# Patient Record
Sex: FEMALE | Race: WHITE | URBAN - METROPOLITAN AREA
[De-identification: names, ages, dates, MRNs, and addresses within clinical notes are randomized per-mention and may not be internally consistent; named-entity substitution may affect disease eponyms.]

---

## 2018-03-05 ENCOUNTER — THERAPY VISIT (OUTPATIENT)
Dept: PHYSICAL THERAPY | Facility: CLINIC | Age: 68
End: 2018-03-05
Payer: COMMERCIAL

## 2018-03-05 DIAGNOSIS — M25.551 HIP PAIN, RIGHT: Primary | ICD-10-CM

## 2018-03-05 PROCEDURE — G8978 MOBILITY CURRENT STATUS: HCPCS | Mod: GP | Performed by: PHYSICAL THERAPIST

## 2018-03-05 PROCEDURE — G8979 MOBILITY GOAL STATUS: HCPCS | Mod: GP | Performed by: PHYSICAL THERAPIST

## 2018-03-05 PROCEDURE — 97110 THERAPEUTIC EXERCISES: CPT | Mod: GP | Performed by: PHYSICAL THERAPIST

## 2018-03-05 PROCEDURE — 97112 NEUROMUSCULAR REEDUCATION: CPT | Mod: GP | Performed by: PHYSICAL THERAPIST

## 2018-03-05 PROCEDURE — 97161 PT EVAL LOW COMPLEX 20 MIN: CPT | Mod: GP | Performed by: PHYSICAL THERAPIST

## 2018-03-05 ASSESSMENT — ACTIVITIES OF DAILY LIVING (ADL)
GOING_DOWN_1_FLIGHT_OF_STAIRS: NO DIFFICULTY AT ALL
WALKING_UP_STEEP_HILLS: MODERATE DIFFICULTY
HOS_ADL_HIGHEST_POTENTIAL_SCORE: 68
SITTING_FOR_15_MINUTES: NO DIFFICULTY AT ALL
GETTING_INTO_AND_OUT_OF_A_BATHTUB: NO DIFFICULTY AT ALL
GOING_UP_1_FLIGHT_OF_STAIRS: SLIGHT DIFFICULTY
STANDING_FOR_15_MINUTES: SLIGHT DIFFICULTY
WALKING_15_MINUTES_OR_GREATER: SLIGHT DIFFICULTY
DEEP_SQUATTING: UNABLE TO DO
HOS_ADL_COUNT: 17
WALKING_DOWN_STEEP_HILLS: NO DIFFICULTY AT ALL
WALKING_APPROXIMATELY_10_MINUTES: SLIGHT DIFFICULTY
ROLLING_OVER_IN_BED: NO DIFFICULTY AT ALL
LIGHT_TO_MODERATE_WORK: NO DIFFICULTY AT ALL
PUTTING_ON_SOCKS_AND_SHOES: NO DIFFICULTY AT ALL
HOS_ADL_ITEM_SCORE_TOTAL: 56
HOS_ADL_SCORE(%): 82.35
WALKING_INITIALLY: NO DIFFICULTY AT ALL
HEAVY_WORK: MODERATE DIFFICULTY
STEPPING_UP_AND_DOWN_CURBS: NO DIFFICULTY AT ALL
GETTING_INTO_AND_OUT_OF_AN_AVERAGE_CAR: NO DIFFICULTY AT ALL
RECREATIONAL_ACTIVITIES: NO DIFFICULTY AT ALL
TWISTING/PIVOTING_ON_INVOLVED_LEG: NO DIFFICULTY AT ALL

## 2018-03-05 NOTE — MR AVS SNAPSHOT
"              After Visit Summary   3/5/2018    Ava Goddard    MRN: [id number]           Patient Information     Date Of Birth          1950        Visit Information        Provider Department      3/5/2018 10:20 AM Anna Rees PT [facility]        Today's Diagnoses     Hip pain, right    -  1       Follow-ups after your visit        Your next 10 appointments already scheduled     Mar 19, 2018 10:20 AM CDT   MULU Extremity with Anna Rees PT   [facility] ([facility] [place])    [address]  [facility] [address] [phone]              Who to contact     If you have questions or need follow up information about today's clinic visit or your schedule please contact [facility] directly at [phone].  Normal or non-critical lab and imaging results will be communicated to you by Moment.Ushart, letter or phone within 4 business days after the clinic has received the results. If you do not hear from us within 7 days, please contact the clinic through Moment.Ushart or phone. If you have a critical or abnormal lab result, we will notify you by phone as soon as possible.  Submit refill requests through Munch On Me or call your pharmacy and they will forward the refill request to us. Please allow 3 business days for your refill to be completed.          Additional Information About Your Visit        MyChart Information     Munch On Me lets you send messages to your doctor, view your test results, renew your prescriptions, schedule appointments and more. To sign up, go to [email or web address] . Click on \"Log in\" on the left side of the screen, which will take you to the Welcome page. Then click on \"Sign up Now\" on the right side of the page.     You will be asked to enter the access code listed below, as well as some personal " information. Please follow the directions to create your username and password.     Your access code is: [email or web address]  Expires: 6/3/2018 11:47 PM     Your access code will  in 90 days. If you need help or a new code, please call your [place] clinic or [phone].        Care EveryWhere ID     This is your Care EveryWhere ID. This could be used by other organizations to access your [place] medical records  [id number]         Blood Pressure from Last 3 Encounters:   05/13/15 127/79   03/04/15 134/84   02/05/15 127/70    Weight from Last 3 Encounters:   05/13/15 95.7 kg (211 lb)   03/04/15 93.4 kg (206 lb)   02/05/15 93 kg (205 lb)              We Performed the Following     MULU Inital Eval Report     Neuromuscular Re-Education     PT Eval, Low Complexity (09244)     Therapeutic Exercises        Primary Care Provider Office Phone # Fax #    Donita Rhina Lucio, APRN [facility] [phone]       [address] [place] [address]        Equal Access to Services     [facility]: Hadii aad ku hadasho Soomaali, waaxda luqadaha, qaybta kaalmada adeegyada, kristofer villafana . So [facility] [phone].    ATENCIÓN: Si habla español, tiene a tamayo disposición servicios gratuitos de asistencia lingüística. [facility] [phone].    We comply with applicable federal civil rights laws and [place] laws. We do not discriminate on the basis of race, color, national origin, age, disability, sex, sexual orientation, or gender identity.            Thank you!     Thank you for choosing [facility]  for your care. Our goal is always to provide you with excellent care. Hearing back from our patients is one way we can continue to improve our services. Please take a few minutes to complete the written survey that you may receive in the mail after your visit with us. Thank you!             Your Updated Medication List - Protect others around  "you: Learn how to safely use, store and throw away your medicines at www.disposemymeds.org.          This list is accurate as of 3/5/18 11:47 PM.  Always use your most recent med list.                   Brand Name Dispense Instructions for use Diagnosis    ACCU-CHEK COMPLETE Kit     1 each    1 each 2 times daily    Type 2 diabetes, HbA1c goal < 7% (H)       ASPIRIN PO      Take 81 mg by mouth daily        blood glucose monitoring test strip    no brand specified    100 strip    Use to test blood sugar 2 times daily or as directed.    Type 2 diabetes, HbA1c goal < 7% (H)       Calcium Carb-Cholecalciferol 600-800 MG-UNIT Tabs      Take 1 tablet by mouth daily        clotrimazole 1 % cream    LOTRIMIN    40 g    Apply topically 2 times daily    Onychomycosis       cyanocobalamin 1000 MCG/ML injection    VITAMIN B12    1 mL    Inject 1 mL (1,000 mcg) into the muscle every 30 days    Vitamin B12 deficiency       ferrous sulfate 325 (65 FE) MG tablet    IRON     Take 325 mg by mouth daily (with breakfast)        * glipiZIDE 2.5 MG 24 hr tablet    GLUCOTROL XL     2.5 mg        * glipiZIDE 2.5 MG 24 hr tablet    GLUCOTROL XL    90 tablet    TAKE 1 TABLET (2.5 MG) BY MOUTH DAILY    Diabetes mellitus, type 2 (H)       IBUPROFEN PO      Take 400 mg by mouth daily        insulin glargine 100 UNIT/ML injection    LANTUS SOLOSTAR    3 Month    Inject 21 units nightly.    Type 2 diabetes, HbA1c goal < 7% (H)       insulin syringe-needle U-100 30G X 1/2\" 1 ML    BD insulin syringe ultrafine    12 each    Use one syringe monthly to inject B12.    Vitamin B12 deficiency       levothyroxine 125 MCG tablet    SYNTHROID/LEVOTHROID     Take 125 mcg by mouth daily        lisinopril 40 MG tablet    PRINIVIL/ZESTRIL     Take 1 tablet (40 mg) by mouth daily        * metFORMIN 1000 MG tablet    GLUCOPHAGE     Take 1,000 mg by mouth 2 times daily (with meals)        * metFORMIN 500 MG tablet    GLUCOPHAGE    180 tablet    TAKE 2 TABLETS " (1,000 MG) BY MOUTH 2 TIMES DAILY (WITH MEALS)    Type 2 diabetes, HbA1c goal < 7% (H)       order for DME     2 each    Equipment being ordered: bilateral wrist splints    Chronic thumb pain, unspecified laterality       simvastatin 5 MG tablet    ZOCOR    90 tablet    TAKE 2 TABLETS BY MOUTH AT BEDTIME    Other and unspecified hyperlipidemia       * Notice:  This list has 4 medication(s) that are the same as other medications prescribed for you. Read the directions carefully, and ask your doctor or other care provider to review them with you.

## 2018-03-05 NOTE — PROGRESS NOTES
[place] [facility] Initial Evaluation  Subjective:  Patient is a 67 year old female presenting with rehab right hip hpi.   Ava Goddard is a 67 year old female with a right hip condition.  Condition occurred with:  Other reason (pulled myself up onto a shuttle bus).  Condition occurred: at work.  This is a new condition  2 weeks on or about 2/19/2018, I was stepping up into a shuttle bus with my right leg was up on a step.  I started to feel a pull in the right low back and gluteal.  I can't sit for a long time.  It is gradually getting better..    Patient reports pain:  Posterior (gluteal ).  Radiates to:  Low back.  Pain is described as aching and is intermittent and reported as 2/10 and 5/10.  Associated with: none. Pain is the same all the time.  Symptoms are exacerbated by lying on extremity and relieved by NSAID's.  Since onset symptoms are gradually improving.  Special tests:  X-ray.  Previous treatment: none.    General health as reported by patient is good.  Pertinent medical history includes:  Rheumatoid arthritis, high blood pressure, osteoporosis, history of fractures, diabetes, overweight, thyroid problems and anemia.  Medical allergies: no.  Other surgeries include:  Orthopedic surgery (left TKA, bilateral ankle surgery with plates).  Current medications:  Thyroid medication and anti-inflammatory.  Current occupation is .  Patient is working in normal job without restrictions.  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    Barriers: house with daughter.    Red flags:  None as reported by the patient.                        Objective:  Standing Alignment:    Cervical/Thoracic:  Forward head  Shoulder/UE:  Rounded shoulders  Lumbar:  Lordosis incr  Pelvic:  Normal          Gait:      Deviations:  Hip:  Decr dynamic control L and decr dynamic control R    Flexibility/Screens:   Positive screens:  HipNegative screens:  Lumbar     Lower Extremity:  Decreased left lower extremity flexibility:Hip Flexors; Quadriceps; Hamstrings and Gastroc    Decreased right lower extremity flexibility:  Hip Flexors; Quadriceps; Hamstrings and Gastroc               Lumbar/SI Evaluation  ROM:    AROM Lumbar:   Flexion:        Floor, repetition does not increase in pain   Ext:                    Moderate with end range pain    Side Bend:        Left:     Right:   Rotation:           Left:     Right:   Side Glide:        Left:     Right:           Lumbar Myotomes:    T12-L3 (Hip Flex):  Left: 5    Right: 5  L2-4 (Quads):  Left:  5    Right:  5  L4 (Ankle DF):  Left:  5    Right:  5            Neural Tension/Mobility:      Left side:Slump (tightness)  negative.     Right side:   Slump (tightness)  negative.   Lumbar Palpation:      Tenderness present at Right: Quadratus Lumborum; Erector Spinae; Piriformis; Gluteus Medius; Greater Trochanter and Vertebral  Functional Tests:  Core strength and proprioception lumbar: balance bilateral 1 sec         Lumbar Provocation:      Left negative with:  Mobility  Right positive with: PROM hip  Right negative with:  Mobility  Spinal Segmental Conclusions:     Level: Hypo noted at L4 and L5                                        Hip Evaluation  Hip PROM:    Flexion: Left:  Right: WFL with pinch  Extension: Left:  Right: minimal with tightness  Abduction: Left:   Right: moderate movement    Internal Rotation: Left:   Right: WFL  External Rotation: Left:   Right: 45  Knee Flexion: Left:     Right:  WFL  Knee Extension: Left:   Right:  WFL          Hip Strength:    Flexion:   Left: 5/5   Pain:  Right: 5/5   Pain:                    Extension:  Left: 4+/5  Pain:Right: 4-/5    Pain:    Abduction:  Right: 4-/5   +   Pain:        Knee Flexion:  Left: 5/5   Pain:Right: 5/5   Pain:  Knee Extension:  Left: 5/5   Pain:Right: 5/5    Pain:        Hip Special Testing:      Left hip negative for the following special tests:  SLR  (tightness)  Right hip negative for the following special tests:  SLR (tightness)                 General     ROS    Assessment/Plan:    Patient is a 67 year old female with right side hip/back pain  complaints.    Patient has the following significant findings with corresponding treatment plan.                Diagnosis 1:  Right flank and SI pain   Pain -  manual therapy, self management, education, directional preference exercise and home program  Decreased ROM/flexibility - manual therapy, therapeutic exercise, therapeutic activity and home program  Decreased joint mobility - manual therapy, therapeutic exercise, therapeutic activity and home program  Decreased strength - therapeutic exercise, therapeutic activities and home program  Impaired balance - neuro re-education, therapeutic activities and home program  Impaired muscle performance - neuro re-education and home program  Impaired posture - neuro re-education, therapeutic activities and home program  Evaluation ongoing    Therapy Evaluation Codes:   1) History comprised of:   Personal factors that impact the plan of care:      Profession and Work status.    Comorbidity factors that impact the plan of care are:      Diabetes, High blood pressure, Implanted device, Pain at night/rest, Rheumatoid arthritis and bilateral ankle surgeries, osteoporosis, anemia.     Medications impacting care: thyroid.  2) Examination of Body Systems comprised of:   Body structures and functions that impact the plan of care:      Hip, Lumbar spine, Pelvis and Sacral illiac joint.   Activity limitations that impact the plan of care are:      Bending, Driving, Lifting, Walking, Working, Sleeping and Laying down.  3) Clinical presentation characteristics are:   Stable/Uncomplicated.  4) Decision-Making    Low complexity using standardized patient assessment instrument and/or measureable assessment of functional outcome.  Cumulative Therapy Evaluation is: Low complexity.    Previous  and current functional limitations:  (See Goal Flow Sheet for this information)    Short term and Long term goals: (See Goal Flow Sheet for this information)     Communication ability:  Patient appears to be able to clearly communicate and understand verbal and written communication and follow directions correctly.  Treatment Explanation - The following has been discussed with the patient:   RX ordered/plan of care  Possible risks and side effects  This patient would benefit from PT intervention to resume normal activities.   Rehab potential is good.    Frequency:  1 X week, once daily  Duration:  for 6 weeks  Discharge Plan:  Achieve all LTG.  Independent in home treatment program.    Please refer to the daily flowsheet for treatment today, total treatment time and time spent performing 1:1 timed codes.

## 2018-03-05 NOTE — LETTER
[facility]  [address]  [facility] [address]  [phone]    2018    Re: Ava Goddard   :   1950  MRN:  [id number]   REFERRING PHYSICIAN:   Becca MARC [facility]    Date of Initial Evaluation:  3/5/2018  Visits:  Rxs Used: 1  Reason for Referral:  Hip pain, right    EVALUATION SUMMARY    [facility] Initial Evaluation  Subjective:  Patient is a 67 year old female presenting with rehab right hip hpi.   Ava Goddard is a 67 year old female with a right hip condition.  Condition occurred with:  Other reason (pulled myself up onto a shuttle bus).  Condition occurred: at work.  This is a new condition  2 weeks on or about 2018, I was stepping up into a shuttle bus with my right leg was up on a step.  I started to feel a pull in the right low back and gluteal.  I can't sit for a long time.  It is gradually getting better.    Patient reports pain:  Posterior (gluteal).  Radiates to:  Low back.  Pain is described as aching and is intermittent and reported as 2/10 and 5/10.  Associated with: none. Pain is the same all the time.  Symptoms are exacerbated by lying on extremity and relieved by NSAID's.  Since onset symptoms are gradually improving.  Special tests:  X-ray.  Previous treatment: none.  General health as reported by patient is good.  Pertinent medical history includes:  Rheumatoid arthritis, high blood pressure, osteoporosis, history of fractures, diabetes, overweight, thyroid problems and anemia.  Medical allergies: no.  Other surgeries include:  Orthopedic surgery (left TKA, bilateral ankle surgery with plates).  Current medications:  Thyroid medication and anti-inflammatory.  Current occupation is .  Patient is working in normal job without restrictions.  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    Barriers: house with daughter.    Red flags:  None as reported by the patient.    Objective:  Standing Alignment:    Cervical/Thoracic:  Forward head  Shoulder/UE:  Rounded shoulders  Lumbar:  Lordosis incr  Pelvic:  Normal        Re: Ava Goddard   :   1950    Gait:    Deviations:  Hip:  Decr dynamic control L and decr dynamic control R    Flexibility/Screens:   Positive screens:  HipNegative screens: Lumbar     Lower Extremity:  Decreased left lower extremity flexibility:Hip Flexors; Quadriceps; Hamstrings and Gastroc    Decreased right lower extremity flexibility:  Hip Flexors; Quadriceps; Hamstrings and Gastroc    Lumbar/SI Evaluation  ROM:    AROM Lumbar:   Flexion:        Floor, repetition does not increase in pain   Ext:                    Moderate with end range pain    Side Bend:        Left:     Right:   Rotation:           Left:     Right:   Side Glide:        Left:     Right:         Lumbar Myotomes:    T12-L3 (Hip Flex):  Left: 5    Right: 5  L2-4 (Quads):  Left:  5    Right:  5  L4 (Ankle DF):  Left:  5    Right:  5    Neural Tension/Mobility:    Left side:Slump (tightness)  negative.   Right side:   Slump (tightness)  negative.     Lumbar Palpation:    Tenderness present at Right: Quadratus Lumborum; Erector Spinae; Piriformis; Gluteus Medius; Greater Trochanter and Vertebral    Functional Tests:  Core strength and proprioception lumbar: balance bilateral 1 sec     Lumbar Provocation:    Left negative with:  Mobility  Right positive with: PROM hip  Right negative with:  Mobility    Spinal Segmental Conclusions:   Level: Hypo noted at L4 and L5    Hip Evaluation  Hip PROM:    Flexion: Left:  Right: WFL with pinch  Extension: Left:  Right: minimal with tightness  Abduction: Left:   Right: moderate movement    Re: Ava Goddard   :   1950    Internal Rotation: Left:   Right: WFL  External Rotation: Left:   Right: 45  Knee Flexion:  Left:     Right:  WFL  Knee Extension: Left:   Right:  WFL    Hip Strength:    Flexion:   Left: 5/5   Pain:  Right: 5/5   Pain:  Extension:  Left: 4+/5  Pain:Right: 4-/5    Pain:    Abduction:  Right: 4-/5   +   Pain:  Knee Flexion:  Left: 5/5   Pain:Right: 5/5   Pain:  Knee Extension:  Left: 5/5   Pain:Right: 5/5    Pain:    Hip Special Testing:    Left hip negative for the following special tests:  SLR (tightness)  Right hip negative for the following special tests:  SLR (tightness)      Assessment/Plan:    Patient is a 67 year old female with right side hip/back pain  complaints.    Patient has the following significant findings with corresponding treatment plan.                Diagnosis 1:  Right flank and SI pain   Pain -  manual therapy, self management, education, directional preference exercise and home program  Decreased ROM/flexibility - manual therapy, therapeutic exercise, therapeutic activity and home program  Decreased joint mobility - manual therapy, therapeutic exercise, therapeutic activity and home program  Decreased strength - therapeutic exercise, therapeutic activities and home program  Impaired balance - neuro re-education, therapeutic activities and home program  Impaired muscle performance - neuro re-education and home program  Impaired posture - neuro re-education, therapeutic activities and home program  Evaluation ongoing    Therapy Evaluation Codes:   1) History comprised of:   Personal factors that impact the plan of care:      Profession and Work status.    Comorbidity factors that impact the plan of care are:      Diabetes, High blood pressure, Implanted device, Pain at night/rest,   Rheumatoid arthritis and bilateral ankle surgeries, osteoporosis, anemia.     Medications impacting care: thyroid.  2) Examination of Body Systems comprised of:   Body structures and functions that impact the plan of care:      Hip, Lumbar spine, Pelvis and Sacral illiac joint.   Activity limitations that  impact the plan of care are:      Bending, Driving, Lifting, Walking, Working, Sleeping and Laying down.  3) Clinical presentation characteristics are:   Stable/Uncomplicated.  4) Decision-Making    Low complexity using standardized patient assessment instrument and/or   measureable assessment of functional outcome.  Cumulative Therapy Evaluation is: Low complexity.  Re: Ava Goddard   :   1950    Previous and current functional limitations:  (See Goal Flow Sheet for this information)    Short term and Long term goals: (See Goal Flow Sheet for this information)     Communication ability:  Patient appears to be able to clearly communicate and understand verbal and written communication and follow directions correctly.  Treatment Explanation - The following has been discussed with the patient:   RX ordered/plan of care  Possible risks and side effects  This patient would benefit from PT intervention to resume normal activities.   Rehab potential is good.    Frequency:  1 X week, once daily  Duration:  for 6 weeks  Discharge Plan:  Achieve all LTG.  Independent in home treatment program.        Thank you for your referral.      INQUIRIES  Therapist: Anna Rees, PT  [facility]  [address] [address]  Phone: [phone]  Fax: [phone]

## 2018-04-02 PROBLEM — M25.551 HIP PAIN, RIGHT: Status: RESOLVED | Noted: 2018-03-05 | Resolved: 2018-04-02

## 2018-04-02 NOTE — PROGRESS NOTES
Reported doing well.  Felt good doing the exercises.  Probably not returning.  Plan to DC at this time.

## 2018-10-03 ENCOUNTER — OFFICE VISIT (OUTPATIENT)
Dept: ORTHOPEDICS | Facility: CLINIC | Age: 68
End: 2018-10-03
Payer: COMMERCIAL

## 2018-10-03 VITALS
WEIGHT: 202 LBS | RESPIRATION RATE: 18 BRPM | SYSTOLIC BLOOD PRESSURE: 146 MMHG | HEIGHT: 67 IN | BODY MASS INDEX: 31.71 KG/M2 | DIASTOLIC BLOOD PRESSURE: 84 MMHG

## 2018-10-03 DIAGNOSIS — S42.201A TRAUMATIC CLOSED FX OF PROXIMAL HUMERUS WITH MINIMAL DISPLACEMENT, RIGHT, INITIAL ENCOUNTER: Primary | ICD-10-CM

## 2018-10-03 PROCEDURE — 99203 OFFICE O/P NEW LOW 30 MIN: CPT | Mod: 57 | Performed by: ORTHOPAEDIC SURGERY

## 2018-10-03 PROCEDURE — 23600 CLTX PROX HUMRL FX W/O MNPJ: CPT | Mod: RT | Performed by: ORTHOPAEDIC SURGERY

## 2018-10-03 ASSESSMENT — PAIN SCALES - GENERAL: PAINLEVEL: EXTREME PAIN (8)

## 2018-10-03 NOTE — MR AVS SNAPSHOT
"              After Visit Summary   10/3/2018    Ava Goddard    MRN: [id number]           Patient Information     Date Of Birth          1950        Visit Information        Provider Department      10/3/2018 11:00 AM Ron Robert MD [facility]        Today's Diagnoses     Traumatic closed fx of proximal humerus with minimal displacement, right, initial encounter    -  1       Follow-ups after your visit        Follow-up notes from your care team     Return in about 2 weeks (around 10/17/2018) for Fracture recheck.      Who to contact     If you have questions or need follow up information about today's clinic visit or your schedule please contact [facility] directly at [phone].  Normal or non-critical lab and imaging results will be communicated to you by MyChart, letter or phone within 4 business days after the clinic has received the results. If you do not hear from us within 7 days, please contact the clinic through MyChart or phone. If you have a critical or abnormal lab result, we will notify you by phone as soon as possible.  Submit refill requests through [facility] or call your pharmacy and they will forward the refill request to us. Please allow 3 business days for your refill to be completed.          Additional Information About Your Visit        Care EveryWhere ID     This is your Care EveryWhere ID. This could be used by other organizations to access your [facility] medical records  [id number]        Your Vitals Were     Respirations Height BMI (Body Mass Index)             18 5' 6.5\" (1.689 m) 32.12 kg/m2          Blood Pressure from Last 3 Encounters:   10/03/18 146/84   05/13/15 127/79   03/04/15 134/84    Weight from Last 3 Encounters:   10/03/18 202 lb (91.6 kg)   05/13/15 211 lb (95.7 kg)   03/04/15 206 lb (93.4 kg)              We Performed the Following     CLOSED TX PROX HUMERUS FX W/O MANIPULATION        Primary Care Provider Office Phone # Fax # "    Donita Lucio, APRN [phone]       [address] [facility]  [place] [address]        Equal Access to Services     CARLOSPASQUALE CHRISTOS : Hadii shantell ku roneno Soomaali, waaxda luqadaha, qaybta kaalmada adereginada, kristofer freemansahil sanket. So [facility] [phone].    ATENCIÓN: Si habla español, tiene a tamayo disposición servicios gratuitos de asistencia lingüística. Llame al [phone].    We comply with applicable federal civil rights laws and [place] laws. We do not discriminate on the basis of race, color, national origin, age, disability, sex, sexual orientation, or gender identity.            Thank you!     Thank you for choosing [facility]  for your care. Our goal is always to provide you with excellent care. Hearing back from our patients is one way we can continue to improve our services. Please take a few minutes to complete the written survey that you may receive in the mail after your visit with us. Thank you!             Your Updated Medication List - Protect others around you: Learn how to safely use, store and throw away your medicines at www.disposemymeds.org.          This list is accurate as of 10/3/18  3:55 PM.  Always use your most recent med list.                   Brand Name Dispense Instructions for use Diagnosis    ACCU-CHEK COMPLETE Kit     1 each    1 each 2 times daily    Type 2 diabetes, HbA1c goal < 7% (H)       ASPIRIN PO      Take 81 mg by mouth daily        blood glucose monitoring test strip    no brand specified    100 strip    Use to test blood sugar 2 times daily or as directed.    Type 2 diabetes, HbA1c goal < 7% (H)       Calcium Carb-Cholecalciferol 600-800 MG-UNIT Tabs      Take 1 tablet by mouth daily        clotrimazole 1 % cream    LOTRIMIN    40 g    Apply topically 2 times daily    Onychomycosis       cyanocobalamin 1000 MCG/ML injection    VITAMIN B12    1 mL    Inject 1 mL (1,000 mcg) into the muscle every 30 days     "Vitamin B12 deficiency       ferrous sulfate 325 (65 Fe) MG tablet    IRON     Take 325 mg by mouth daily (with breakfast)        * glipiZIDE 2.5 MG 24 hr tablet    GLUCOTROL XL     2.5 mg        * glipiZIDE 2.5 MG 24 hr tablet    GLUCOTROL XL    90 tablet    TAKE 1 TABLET (2.5 MG) BY MOUTH DAILY    Diabetes mellitus, type 2 (H)       IBUPROFEN PO      Take 400 mg by mouth daily        insulin glargine 100 UNIT/ML injection    LANTUS SOLOSTAR    3 Month    Inject 21 units nightly.    Type 2 diabetes, HbA1c goal < 7% (H)       insulin syringe-needle U-100 30G X 1/2\" 1 ML    BD insulin syringe ultrafine    12 each    Use one syringe monthly to inject B12.    Vitamin B12 deficiency       levothyroxine 125 MCG tablet    SYNTHROID/LEVOTHROID     Take 125 mcg by mouth daily        lisinopril 40 MG tablet    PRINIVIL/ZESTRIL     Take 1 tablet (40 mg) by mouth daily        * metFORMIN 1000 MG tablet    GLUCOPHAGE     Take 1,000 mg by mouth 2 times daily (with meals)        * metFORMIN 500 MG tablet    GLUCOPHAGE    180 tablet    TAKE 2 TABLETS (1,000 MG) BY MOUTH 2 TIMES DAILY (WITH MEALS)    Type 2 diabetes, HbA1c goal < 7% (H)       order for DME     2 each    Equipment being ordered: bilateral wrist splints    Chronic thumb pain, unspecified laterality       simvastatin 5 MG tablet    ZOCOR    90 tablet    TAKE 2 TABLETS BY MOUTH AT BEDTIME    Other and unspecified hyperlipidemia       * Notice:  This list has 4 medication(s) that are the same as other medications prescribed for you. Read the directions carefully, and ask your doctor or other care provider to review them with you.      "

## 2018-10-03 NOTE — LETTER
"    10/3/2018         RE: Ava Goddard  [address]  [place] [facility] [address]        Dear Colleague,    Thank you for referring your patient, Ava Goddard, to the [facility]. Please see a copy of my visit note below.    CHIEF COMPLAINT:   Chief Complaint   Patient presents with     Right Shoulder - Pain     She fell at 1:50am while on her way to the bathroom. Went to [facility]. Unsure how she fell. Had x-rays, given sling and follow-up here.      Shoulder Pain     She is only taking ASA for pain, does not want to take any \"strong meds\". Right hand dominant. Presents here with her son.    .    HISTORY:  Ava Goddard is a 67 year old female, right -hand dominant, who is seen for right shoulder injury that occurred at 1:50 AM this morning, 10/3/2018. She was walking to the bathroom, felt lightheaded and tripped over her dog, falling onto the right shoulder. Was seen at [facility] and this morning and treated with a sling. Told to follow up with orthopaedics. She has extreme pain today, rated an 8/10. Pain is located over the proximal humerus and lateral shoulder. For treatment, she remains in the shoulder sling and has been taking ibuprofen and aspirin. Denies wanting to take stronger medication. Presents with her son today.     Onset: following acute injury: fall  Symptoms have been worsening since that time.  Aggravated by: with shoulder range of motion and palpation.  Relieved by: at rest, with ibuprofen briefly  Present symptoms: pain with any shoulder range of motion and palpation  Pain location: lateral shoulder, proximal arm  Pain severity: 8/10  Pain quality: sharp  Frequency of symptoms: frequently  Associated symptoms: none    Treatment up to this point: sling, aspirin, ibuprofen (declined pain medications at emergency room)  Prior history of related problems: no prior problems with this area in the past    Significant Orthopedic past medical history: none  Usual level " of recreational activity: sedentary  Usual level of work activity: sedentary -     Other PMH:  has a past medical history of Diabetes (H); Hypertension; and Thyroid disease.  Patient Active Problem List    Diagnosis Date Noted     Rosacea 2015     Priority: Medium     Thyroid nodule 2015     Priority: Medium     Type 2 diabetes mellitus with hyperglycemia (H) 2015     Priority: Medium     Hypothyroidism 2015     Priority: Medium     B12 deficiency 2015     Priority: Medium     S/P gastric bypass 2015     Priority: Medium     Iron deficiency anemia 2015     Priority: Medium     Osteoporosis 2015     Priority: Medium     Problem list name updated by automated process. Provider to review       Hyperlipidemia with target LDL less than 100 2015     Priority: Medium     Diagnosis updated by automated process. Provider to review and confirm.       Hypertension goal BP (blood pressure) < 140/90 2015     Priority: Medium       Surgical Hx:  has a past surgical history that includes  section (); TOTAL KNEE ARTHROPLASTY (Left); gastric bypass; Right Ankle ORIF; cataract iol, rt/lt; and tubal ligation.    Medications:   Current Outpatient Prescriptions:      ASPIRIN PO, Take 81 mg by mouth daily, Disp: , Rfl:      Blood Glucose Monitoring Suppl (ACCU-CHEK COMPLETE) KIT, 1 each 2 times daily, Disp: 1 each, Rfl: 0     blood glucose test strip, Use to test blood sugar 2 times daily or as directed., Disp: 100 strip, Rfl: 5     Calcium Carb-Cholecalciferol 600-800 MG-UNIT TABS, Take 1 tablet by mouth daily, Disp: , Rfl:      clotrimazole (LOTRIMIN) 1 % cream, Apply topically 2 times daily, Disp: 40 g, Rfl: 2     cyanocobalamin (VITAMIN B12) 1000 MCG/ML injection, Inject 1 mL (1,000 mcg) into the muscle every 30 days, Disp: 1 mL, Rfl: 11     ferrous sulfate (IRON) 325 (65 FE) MG tablet, Take 325 mg by mouth daily (with breakfast), Disp: , Rfl:       "glipiZIDE (GLUCOTROL XL) 2.5 MG 24 hr tablet, TAKE 1 TABLET (2.5 MG) BY MOUTH DAILY, Disp: 90 tablet, Rfl: 0     glipiZIDE (GLUCOTROL XL) 2.5 MG 24 hr tablet, 2.5 mg, Disp: , Rfl: 0     IBUPROFEN PO, Take 400 mg by mouth daily, Disp: , Rfl:      insulin glargine (LANTUS SOLOSTAR) 100 UNIT/ML PEN, Inject 21 units nightly., Disp: 3 Month, Rfl: 1     insulin syringe-needle U-100 (BD INSULIN SYRINGE ULTRAFINE) 30G X 1/2\" 1 ML, Use one syringe monthly to inject B12., Disp: 12 each, Rfl: 1     levothyroxine (SYNTHROID, LEVOTHROID) 125 MCG tablet, Take 125 mcg by mouth daily, Disp: , Rfl:      lisinopril (PRINIVIL,ZESTRIL) 40 MG tablet, Take 1 tablet (40 mg) by mouth daily, Disp: , Rfl:      metFORMIN (GLUCOPHAGE) 1000 MG tablet, Take 1,000 mg by mouth 2 times daily (with meals), Disp: , Rfl:      metFORMIN (GLUCOPHAGE) 500 MG tablet, TAKE 2 TABLETS (1,000 MG) BY MOUTH 2 TIMES DAILY (WITH MEALS), Disp: 180 tablet, Rfl: 0     ORDER FOR DME, Equipment being ordered: bilateral wrist splints, Disp: 2 each, Rfl: 0     simvastatin (ZOCOR) 5 MG tablet, TAKE 2 TABLETS BY MOUTH AT BEDTIME, Disp: 90 tablet, Rfl: 0    Allergies: No Known Allergies    Social Hx:  .  reports that she has never smoked. She has never used smokeless tobacco. She reports that she drinks alcohol. She reports that she does not use illicit drugs.    Family Hx: family history includes Diabetes in her paternal grandfather; Hypertension in her sister and son; Thyroid Disease in her maternal grandmother..    REVIEW OF SYSTEMS: 10 point ROS neg other than the symptoms noted above in the HPI and PMH. Notables include  CONSTITUTIONAL:NEGATIVE for fever, chills, change in weight  INTEGUMENTARY/SKIN: NEGATIVE for worrisome rashes, moles or lesions  MUSCULOSKELETAL:See HPI above  NEURO: NEGATIVE for weakness, dizziness or paresthesias    This document serves as a record of the services and decisions personally performed and made by Ron Robert MD. It was created " "on his behalf by Danielle Feliz, a trained medical scribe. The creation of this document is based the provider's statements to the medical scribe.    Scribbroderick Feliz 11:00 AM 10/3/2018    PHYSICAL EXAM:  /84 (BP Location: Left arm)  Resp 18  Ht 1.689 m (5' 6.5\")  Wt 91.6 kg (202 lb)  BMI 32.12 kg/m2   GENERAL APPEARANCE: healthy, alert, no distress  SKIN: no suspicious lesions or rashes  NEURO: Normal strength and tone, mentation intact and speech normal  PSYCH:  mentation appears normal and affect normal, not anxious  RESPIRATORY: No increased work of breathing.  VASCULAR: Radial pulses 2+ and brisk cappillary refill     MUSCULOSKELETAL:    RIGHT UPPER EXTREMITY:  Sensation intact to light touch in median, radial, ulnar and axillary nerve distributions  Palpable 2+ radial pulse, brisk capillary refill to all fingers, wwp  Intact epl fpl fdp edc wrist flexion/extension biceps triceps deltoid    RIGHT SHOULDER:  Shoulder Inspection: mild swelling, mild warmth. No ecchymosis. No erythema.  Tender: diffuse about the shoulder.  Range of Motion: not tested  Strength: not tested    X-RAY INTERPRETATION: 3 views right shoulder obtained 10/3/2018 by [facility] were reviewed personally in clinic today with the patient. On my review, there is a minimally displaced fracture of the proximal humerus neck, mild impaction and comminution.      ASSESSMENT: Ava Goddard is a 67 year old female, right -hand dominant with a minimally displaced right proximal humerus fracture.    PLAN:  * reviewed xrays with patient. Based on the xrays, the fracture is minimally/mildly displaced and in acceptable alignment. As long as fracture maintains alignment, nonoperative treatment can be pursued. I did discuss that we would need to monitor this fracture closely for the next couple of weeks, such that if fracture does displace, we can catch it sooner rather than later, and proceed with treatment at that time. Patient does " understand.      * Explained that swelling into the arm and hand in the coming days is common, as well as bruising.  * Immobilization: continue to be in sling, 24/7.  * non weight bearing, strict.  * Rest  * Activity modification - avoid activities that aggravate symptoms.  * NSAIDS - regular use for inflammation, with food, as long as no contra-indications. Please discuss with pcp if needed.  * Ice twice daily to three times daily.  * Elevation of extremity to reduce swelling  * Tylenol as needed for pain  * return to clinic 2 weeks, AP/Y views right shoulder.       The information in this document, created by a scribe for me, accurately reflects the services I personally performed and the decisions made by me. I have reviewed and approved this document for accuracy.     Ron Robert M.D., M.S.  Dept. of Orthopaedic Surgery  [facility]      Again, thank you for allowing me to participate in the care of your patient.        Sincerely,        Ron Robert MD

## 2018-10-03 NOTE — PROGRESS NOTES
"CHIEF COMPLAINT:   Chief Complaint   Patient presents with     Right Shoulder - Pain     She fell at 1:50am while on her way to the bathroom. Went to [facility]. Unsure how she fell. Had x-rays, given sling and follow-up here.      Shoulder Pain     She is only taking ASA for pain, does not want to take any \"strong meds\". Right hand dominant. Presents here with her son.    .    HISTORY:  Ava Goddard is a 67 year old female, right -hand dominant, who is seen for right shoulder injury that occurred at 1:50 AM this morning, 10/3/2018. She was walking to the bathroom, felt lightheaded and tripped over her dog, falling onto the right shoulder. Was seen at [facility] and this morning and treated with a sling. Told to follow up with orthopaedics. She has extreme pain today, rated an 8/10. Pain is located over the proximal humerus and lateral shoulder. For treatment, she remains in the shoulder sling and has been taking ibuprofen and aspirin. Denies wanting to take stronger medication. Presents with her son today.     Onset: following acute injury: fall  Symptoms have been worsening since that time.  Aggravated by: with shoulder range of motion and palpation.  Relieved by: at rest, with ibuprofen briefly  Present symptoms: pain with any shoulder range of motion and palpation  Pain location: lateral shoulder, proximal arm  Pain severity: 8/10  Pain quality: sharp  Frequency of symptoms: frequently  Associated symptoms: none    Treatment up to this point: sling, aspirin, ibuprofen (declined pain medications at emergency room)  Prior history of related problems: no prior problems with this area in the past    Significant Orthopedic past medical history: none  Usual level of recreational activity: sedentary  Usual level of work activity: sedentary -     Other PMH:  has a past medical history of Diabetes (H); Hypertension; and Thyroid disease.  Patient Active Problem List    Diagnosis Date Noted     Rosacea " 2015     Priority: Medium     Thyroid nodule 2015     Priority: Medium     Type 2 diabetes mellitus with hyperglycemia (H) 2015     Priority: Medium     Hypothyroidism 2015     Priority: Medium     B12 deficiency 2015     Priority: Medium     S/P gastric bypass 2015     Priority: Medium     Iron deficiency anemia 2015     Priority: Medium     Osteoporosis 2015     Priority: Medium     Problem list name updated by automated process. Provider to review       Hyperlipidemia with target LDL less than 100 2015     Priority: Medium     Diagnosis updated by automated process. Provider to review and confirm.       Hypertension goal BP (blood pressure) < 140/90 2015     Priority: Medium       Surgical Hx:  has a past surgical history that includes  section (); TOTAL KNEE ARTHROPLASTY (Left); gastric bypass; Right Ankle ORIF; cataract iol, rt/lt; and tubal ligation.    Medications:   Current Outpatient Prescriptions:      ASPIRIN PO, Take 81 mg by mouth daily, Disp: , Rfl:      Blood Glucose Monitoring Suppl (ACCU-CHEK COMPLETE) KIT, 1 each 2 times daily, Disp: 1 each, Rfl: 0     blood glucose test strip, Use to test blood sugar 2 times daily or as directed., Disp: 100 strip, Rfl: 5     Calcium Carb-Cholecalciferol 600-800 MG-UNIT TABS, Take 1 tablet by mouth daily, Disp: , Rfl:      clotrimazole (LOTRIMIN) 1 % cream, Apply topically 2 times daily, Disp: 40 g, Rfl: 2     cyanocobalamin (VITAMIN B12) 1000 MCG/ML injection, Inject 1 mL (1,000 mcg) into the muscle every 30 days, Disp: 1 mL, Rfl: 11     ferrous sulfate (IRON) 325 (65 FE) MG tablet, Take 325 mg by mouth daily (with breakfast), Disp: , Rfl:      glipiZIDE (GLUCOTROL XL) 2.5 MG 24 hr tablet, TAKE 1 TABLET (2.5 MG) BY MOUTH DAILY, Disp: 90 tablet, Rfl: 0     glipiZIDE (GLUCOTROL XL) 2.5 MG 24 hr tablet, 2.5 mg, Disp: , Rfl: 0     IBUPROFEN PO, Take 400 mg by mouth daily, Disp: , Rfl:      insulin  "glargine (LANTUS SOLOSTAR) 100 UNIT/ML PEN, Inject 21 units nightly., Disp: 3 Month, Rfl: 1     insulin syringe-needle U-100 (BD INSULIN SYRINGE ULTRAFINE) 30G X 1/2\" 1 ML, Use one syringe monthly to inject B12., Disp: 12 each, Rfl: 1     levothyroxine (SYNTHROID, LEVOTHROID) 125 MCG tablet, Take 125 mcg by mouth daily, Disp: , Rfl:      lisinopril (PRINIVIL,ZESTRIL) 40 MG tablet, Take 1 tablet (40 mg) by mouth daily, Disp: , Rfl:      metFORMIN (GLUCOPHAGE) 1000 MG tablet, Take 1,000 mg by mouth 2 times daily (with meals), Disp: , Rfl:      metFORMIN (GLUCOPHAGE) 500 MG tablet, TAKE 2 TABLETS (1,000 MG) BY MOUTH 2 TIMES DAILY (WITH MEALS), Disp: 180 tablet, Rfl: 0     ORDER FOR DME, Equipment being ordered: bilateral wrist splints, Disp: 2 each, Rfl: 0     simvastatin (ZOCOR) 5 MG tablet, TAKE 2 TABLETS BY MOUTH AT BEDTIME, Disp: 90 tablet, Rfl: 0    Allergies: No Known Allergies    Social Hx:  .  reports that she has never smoked. She has never used smokeless tobacco. She reports that she drinks alcohol. She reports that she does not use illicit drugs.    Family Hx: family history includes Diabetes in her paternal grandfather; Hypertension in her sister and son; Thyroid Disease in her maternal grandmother..    REVIEW OF SYSTEMS: 10 point ROS neg other than the symptoms noted above in the HPI and PMH. Notables include  CONSTITUTIONAL:NEGATIVE for fever, chills, change in weight  INTEGUMENTARY/SKIN: NEGATIVE for worrisome rashes, moles or lesions  MUSCULOSKELETAL:See HPI above  NEURO: NEGATIVE for weakness, dizziness or paresthesias    This document serves as a record of the services and decisions personally performed and made by Ron Robert MD. It was created on his behalf by Danielle Feliz, a trained medical scribe. The creation of this document is based the provider's statements to the medical scribe.    Farhat Feliz 11:00 AM 10/3/2018    PHYSICAL EXAM:  /84 (BP Location: Left arm)  Resp 18  Ht " "1.689 m (5' 6.5\")  Wt 91.6 kg (202 lb)  BMI 32.12 kg/m2   GENERAL APPEARANCE: healthy, alert, no distress  SKIN: no suspicious lesions or rashes  NEURO: Normal strength and tone, mentation intact and speech normal  PSYCH:  mentation appears normal and affect normal, not anxious  RESPIRATORY: No increased work of breathing.  VASCULAR: Radial pulses 2+ and brisk cappillary refill     MUSCULOSKELETAL:    RIGHT UPPER EXTREMITY:  Sensation intact to light touch in median, radial, ulnar and axillary nerve distributions  Palpable 2+ radial pulse, brisk capillary refill to all fingers, wwp  Intact epl fpl fdp edc wrist flexion/extension biceps triceps deltoid    RIGHT SHOULDER:  Shoulder Inspection: mild swelling, mild warmth. No ecchymosis. No erythema.  Tender: diffuse about the shoulder.  Range of Motion: not tested  Strength: not tested    X-RAY INTERPRETATION: 3 views right shoulder obtained 10/3/2018 by [facility] were reviewed personally in clinic today with the patient. On my review, there is a minimally displaced fracture of the proximal humerus neck, mild impaction and comminution.      ASSESSMENT: Ava Goddard is a 67 year old female, right -hand dominant with a minimally displaced right proximal humerus fracture.    PLAN:  * reviewed xrays with patient. Based on the xrays, the fracture is minimally/mildly displaced and in acceptable alignment. As long as fracture maintains alignment, nonoperative treatment can be pursued. I did discuss that we would need to monitor this fracture closely for the next couple of weeks, such that if fracture does displace, we can catch it sooner rather than later, and proceed with treatment at that time. Patient does understand.      * Explained that swelling into the arm and hand in the coming days is common, as well as bruising.  * Immobilization: continue to be in sling, 24/7.  * non weight bearing, strict.  * Rest  * Activity modification - avoid activities that " aggravate symptoms.  * NSAIDS - regular use for inflammation, with food, as long as no contra-indications. Please discuss with pcp if needed.  * Ice twice daily to three times daily.  * Elevation of extremity to reduce swelling  * Tylenol as needed for pain  * return to clinic 2 weeks, AP/Y views right shoulder.       The information in this document, created by a scribe for me, accurately reflects the services I personally performed and the decisions made by me. I have reviewed and approved this document for accuracy.     Ron Robert M.D., M.S.  Dept. of Orthopaedic Surgery  [facility]

## 2018-10-08 ENCOUNTER — TELEPHONE (OUTPATIENT)
Dept: ORTHOPEDICS | Facility: CLINIC | Age: 68
End: 2018-10-08

## 2018-10-08 ENCOUNTER — TELEPHONE (OUTPATIENT)
Dept: FAMILY MEDICINE | Facility: CLINIC | Age: 68
End: 2018-10-08

## 2018-10-08 NOTE — TELEPHONE ENCOUNTER
Reason for Call:  Form, our goal is to have forms completed with 72 hours, however, some forms may require a visit or additional information.    Type of letter, form or note:  FMLA    Who is the form from?: Patient    Where did the form come from: Patient or family brought in       What clinic location was the form placed at?: [facility]    Where the form was placed: 's Box    What number is listed as a contact on the form?: [phone]  is Uyen Edwards        Additional comments: please fax to [phone] when completed and then mail back to patient    Call taken on 10/8/2018 at 10:15 AM by Cesia Tobar

## 2018-10-08 NOTE — TELEPHONE ENCOUNTER
Reason for call:  [place] paperwork  Patient called regarding (reason for call): Lety will be dropping off Ava's medical leave paperwork today 10/8/2018 at [place]. She would like to have Dr Robert fill it out and fax it to her job at [phone] att: Hafsa  Additional comments: Please call Ava if any questions and when completed    Phone number to reach patient:  [phone]    Best Time: anytime    Can we leave a detailed message on this number?  YES

## 2018-10-08 NOTE — TELEPHONE ENCOUNTER
We will get the forms on Wednesday when we are in [place].  Cari Dow Certified Medical Assistant

## 2018-10-10 NOTE — TELEPHONE ENCOUNTER
I received, completed, faxed and abstracted forms to Uyen Edwards at [phone] as directed.    Octavio Guzman PA-C, CAQ (Ortho)  Supervising Physician: Ron Robert M.D., M.S.  Dept. of Orthopaedic Surgery  [facility]

## 2018-10-18 ENCOUNTER — OFFICE VISIT (OUTPATIENT)
Dept: ORTHOPEDICS | Facility: CLINIC | Age: 68
End: 2018-10-18
Payer: COMMERCIAL

## 2018-10-18 ENCOUNTER — RADIANT APPOINTMENT (OUTPATIENT)
Dept: GENERAL RADIOLOGY | Facility: CLINIC | Age: 68
End: 2018-10-18
Attending: PHYSICIAN ASSISTANT
Payer: COMMERCIAL

## 2018-10-18 VITALS
HEIGHT: 67 IN | DIASTOLIC BLOOD PRESSURE: 82 MMHG | HEART RATE: 94 BPM | RESPIRATION RATE: 13 BRPM | SYSTOLIC BLOOD PRESSURE: 157 MMHG | BODY MASS INDEX: 31.71 KG/M2 | OXYGEN SATURATION: 97 % | WEIGHT: 202 LBS

## 2018-10-18 DIAGNOSIS — S42.211A CLOSED FRACTURE OF NECK OF RIGHT HUMERUS, INITIAL ENCOUNTER: Primary | ICD-10-CM

## 2018-10-18 DIAGNOSIS — S42.211A CLOSED FRACTURE OF NECK OF RIGHT HUMERUS, INITIAL ENCOUNTER: ICD-10-CM

## 2018-10-18 PROCEDURE — 99207 ZZC FRACTURE CARE IN GLOBAL PERIOD: CPT | Performed by: ORTHOPAEDIC SURGERY

## 2018-10-18 PROCEDURE — 73030 X-RAY EXAM OF SHOULDER: CPT | Mod: RT

## 2018-10-18 NOTE — PROGRESS NOTES
Follow up right proximal humerus fracture on 10/3/18.   In sling now.  She is comfortable.    Xray:  Compared to previous films there has not been significant interval changes;alignment remains acceptable.  Exam shows tenderness at right proximal humerus.  Mild swelling of hand and forearm.  Assessment: right proximal humerus fracture stable.  Plan:  Continue sling, but allow gentle range of motion to elbow and wrist.   Return to clinic next  week with xray right shoulder and plans to start shoulder range of motion..

## 2018-10-18 NOTE — PATIENT INSTRUCTIONS
Return to clinic 1-1 1/2 weeks to see Dr. Ron Robert.  Hopefully can start driving at that time.  Continue sling now

## 2018-10-18 NOTE — LETTER
10/18/2018         RE: Ava Goddard  [address]  [place] [address]        Dear Colleague,    Thank you for referring your patient, Ava Goddard, to the [facility]. Please see a copy of my visit note below.    Follow up right proximal humerus fracture on 10/3/18.   In sling now.  She is comfortable.    Xray:  Compared to previous films there has not been significant interval changes;alignment remains acceptable.  Exam shows tenderness at right proximal humerus.  Mild swelling of hand and forearm.  Assessment: right proximal humerus fracture stable.  Plan:  Continue sling, but allow gentle range of motion to elbow and wrist.   Return to clinic next  week with xray right shoulder and plans to start shoulder range of motion..        Again, thank you for allowing me to participate in the care of your patient.        Sincerely,        Cortes Ramos MD

## 2018-10-18 NOTE — MR AVS SNAPSHOT
"              After Visit Summary   10/18/2018    Ava Goddard    MRN: [id number]           Patient Information     Date Of Birth          1950        Visit Information        Provider Department      10/18/2018 1:45 PM Cortes Ramos MD [facility]        Today's Diagnoses     Closed fracture of neck of right humerus, initial encounter    -  1      Care Instructions    Return to clinic 1-1 1/2 weeks to see Dr. Ron Robert.  Hopefully can start driving at that time.  Continue sling now            Follow-ups after your visit        Who to contact     If you have questions or need follow up information about today's clinic visit or your schedule please contact [facility] directly at [phone].  Normal or non-critical lab and imaging results will be communicated to you by MyChart, letter or phone within 4 business days after the clinic has received the results. If you do not hear from us within 7 days, please contact the clinic through MyChart or phone. If you have a critical or abnormal lab result, we will notify you by phone as soon as possible.  Submit refill requests through [employer] or call your pharmacy and they will forward the refill request to us. Please allow 3 business days for your refill to be completed.          Additional Information About Your Visit        Care EveryWhere ID     This is your Care EveryWhere ID. This could be used by other organizations to access your [place] medical records  [id number]        Your Vitals Were     Pulse Respirations Height Pulse Oximetry BMI (Body Mass Index)       94 13 1.689 m (5' 6.5\") 97% 32.12 kg/m2        Blood Pressure from Last 3 Encounters:   10/18/18 157/82   10/03/18 146/84   05/13/15 127/79    Weight from Last 3 Encounters:   10/18/18 91.6 kg (202 lb)   10/03/18 91.6 kg (202 lb)   05/13/15 95.7 kg (211 lb)               Primary Care Provider Office Phone # Fax #    ROBYN Lr [phone] " [phone]       [address]  [place] [address]        Equal Access to Services     LUCINA SHER : Hadii aad ku hadrosalinao Sobraydenali, waaxda luqadaha, qaybta kaalmada adebrooks, kristofer saulin hayaasahil huertahammad ramos ledy coles. So [facility] [phone].    ATENCIÓN: Si habla español, tiene a tamayo disposición servicios gratuitos de asistencia lingüística. Llame al [phone].    We comply with applicable federal civil rights laws and [place] laws. We do not discriminate on the basis of race, color, national origin, age, disability, sex, sexual orientation, or gender identity.            Thank you!     Thank you for choosing [facility]  for your care. Our goal is always to provide you with excellent care. Hearing back from our patients is one way we can continue to improve our services. Please take a few minutes to complete the written survey that you may receive in the mail after your visit with us. Thank you!             Your Updated Medication List - Protect others around you: Learn how to safely use, store and throw away your medicines at www.disposemymeds.org.          This list is accurate as of 10/18/18  2:14 PM.  Always use your most recent med list.                   Brand Name Dispense Instructions for use Diagnosis    ACCU-CHEK COMPLETE Kit     1 each    1 each 2 times daily    Type 2 diabetes, HbA1c goal < 7% (H)       ASPIRIN PO      Take 81 mg by mouth daily        blood glucose monitoring test strip    no brand specified    100 strip    Use to test blood sugar 2 times daily or as directed.    Type 2 diabetes, HbA1c goal < 7% (H)       Calcium Carb-Cholecalciferol 600-800 MG-UNIT Tabs      Take 1 tablet by mouth daily        clotrimazole 1 % cream    LOTRIMIN    40 g    Apply topically 2 times daily    Onychomycosis       cyanocobalamin 1000 MCG/ML injection    VITAMIN B12    1 mL    Inject 1 mL (1,000 mcg) into the muscle every 30 days    Vitamin B12 deficiency       ferrous sulfate 325 (65  "Fe) MG tablet    IRON     Take 325 mg by mouth daily (with breakfast)        * glipiZIDE 2.5 MG 24 hr tablet    GLUCOTROL XL     2.5 mg        * glipiZIDE 2.5 MG 24 hr tablet    GLUCOTROL XL    90 tablet    TAKE 1 TABLET (2.5 MG) BY MOUTH DAILY    Diabetes mellitus, type 2 (H)       IBUPROFEN PO      Take 400 mg by mouth daily        insulin glargine 100 UNIT/ML injection    LANTUS SOLOSTAR    3 Month    Inject 21 units nightly.    Type 2 diabetes, HbA1c goal < 7% (H)       insulin syringe-needle U-100 30G X 1/2\" 1 ML    BD insulin syringe ultrafine    12 each    Use one syringe monthly to inject B12.    Vitamin B12 deficiency       levothyroxine 125 MCG tablet    SYNTHROID/LEVOTHROID     Take 125 mcg by mouth daily        lisinopril 40 MG tablet    PRINIVIL/ZESTRIL     Take 1 tablet (40 mg) by mouth daily        * metFORMIN 1000 MG tablet    GLUCOPHAGE     Take 1,000 mg by mouth 2 times daily (with meals)        * metFORMIN 500 MG tablet    GLUCOPHAGE    180 tablet    TAKE 2 TABLETS (1,000 MG) BY MOUTH 2 TIMES DAILY (WITH MEALS)    Type 2 diabetes, HbA1c goal < 7% (H)       order for DME     2 each    Equipment being ordered: bilateral wrist splints    Chronic thumb pain, unspecified laterality       simvastatin 5 MG tablet    ZOCOR    90 tablet    TAKE 2 TABLETS BY MOUTH AT BEDTIME    Other and unspecified hyperlipidemia       * Notice:  This list has 4 medication(s) that are the same as other medications prescribed for you. Read the directions carefully, and ask your doctor or other care provider to review them with you.      "

## 2018-10-26 ENCOUNTER — OFFICE VISIT (OUTPATIENT)
Dept: ORTHOPEDICS | Facility: CLINIC | Age: 68
End: 2018-10-26
Payer: COMMERCIAL

## 2018-10-26 ENCOUNTER — RADIANT APPOINTMENT (OUTPATIENT)
Dept: GENERAL RADIOLOGY | Facility: CLINIC | Age: 68
End: 2018-10-26
Attending: PHYSICIAN ASSISTANT
Payer: COMMERCIAL

## 2018-10-26 VITALS
HEART RATE: 80 BPM | RESPIRATION RATE: 12 BRPM | OXYGEN SATURATION: 99 % | SYSTOLIC BLOOD PRESSURE: 124 MMHG | DIASTOLIC BLOOD PRESSURE: 77 MMHG | BODY MASS INDEX: 31.71 KG/M2 | WEIGHT: 202 LBS | HEIGHT: 67 IN

## 2018-10-26 DIAGNOSIS — S42.211D CLOSED FRACTURE OF NECK OF RIGHT HUMERUS WITH ROUTINE HEALING, SUBSEQUENT ENCOUNTER: ICD-10-CM

## 2018-10-26 DIAGNOSIS — S42.211D CLOSED FRACTURE OF NECK OF RIGHT HUMERUS WITH ROUTINE HEALING, SUBSEQUENT ENCOUNTER: Primary | ICD-10-CM

## 2018-10-26 PROCEDURE — 99207 ZZC FRACTURE CARE IN GLOBAL PERIOD: CPT | Performed by: ORTHOPAEDIC SURGERY

## 2018-10-26 PROCEDURE — 73030 X-RAY EXAM OF SHOULDER: CPT | Mod: RT

## 2018-10-26 ASSESSMENT — PAIN SCALES - GENERAL: PAINLEVEL: NO PAIN (0)

## 2018-10-26 NOTE — PROGRESS NOTES
CHIEF COMPLAINT:   Chief Complaint   Patient presents with     Right Shoulder - RECHECK     Trauma     follow up from fall 3 weeks ago. patient is feeling a lot better     INJURY: proximal humerus fracture, right.  DATE of INJURY: 10/3/2018      HISTORY:  Ava Goddard is a 67 year old female, right -hand dominant, seen for fracture followup right proximal humerus fracture on 10/3/2018. She was walking to the bathroom, felt lightheaded and tripped over her dog, falling onto the right shoulder. Was seen at [facility] with xrays showing proximal humerus fracture. Was seen by us and will be treated nonsurgically. Was seen by Dr. Ramos on 10/18/2018 for follow up as we were out of office for fracture recheck and was doing well at that time. It has been 3 weeks since the injury.  No pain today, 0/10. Remained in sling. Would like to return to work asap.      Onset: following acute injury: fall  Symptoms have been improving since that time.  Aggravated by: with shoulder range of motion and palpation.  Relieved by: at rest, with ibuprofen briefly  Present symptoms: pain with any shoulder range of motion and palpation  Pain location: lateral shoulder, proximal arm  Pain severity: 0/10  Pain quality: sharp  Frequency of symptoms: frequently  Associated symptoms: none    Treatment up to this point: sling, aspirin, ibuprofen (declined pain medications at emergency room)  Prior history of related problems: no prior problems with this area in the past    Significant Orthopedic past medical history: none  Usual level of recreational activity: sedentary  Usual level of work activity: sedentary -     Other PMH:  has a past medical history of Diabetes (H); Hypertension; and Thyroid disease.  Patient Active Problem List    Diagnosis Date Noted     Rosacea 03/19/2015     Priority: Medium     Thyroid nodule 03/19/2015     Priority: Medium     Type 2 diabetes mellitus with hyperglycemia (H) 02/05/2015     Priority: Medium  "    Hypothyroidism 2015     Priority: Medium     B12 deficiency 2015     Priority: Medium     S/P gastric bypass 2015     Priority: Medium     Iron deficiency anemia 2015     Priority: Medium     Osteoporosis 2015     Priority: Medium     Problem list name updated by automated process. Provider to review       Hyperlipidemia with target LDL less than 100 2015     Priority: Medium     Diagnosis updated by automated process. Provider to review and confirm.       Hypertension goal BP (blood pressure) < 140/90 2015     Priority: Medium       Surgical Hx:  has a past surgical history that includes  section (); TOTAL KNEE ARTHROPLASTY (Left); gastric bypass; Right Ankle ORIF; cataract iol, rt/lt; and tubal ligation.    Medications:   Current Outpatient Prescriptions:      ASPIRIN PO, Take 81 mg by mouth daily, Disp: , Rfl:      Blood Glucose Monitoring Suppl (ACCU-CHEK COMPLETE) KIT, 1 each 2 times daily, Disp: 1 each, Rfl: 0     blood glucose test strip, Use to test blood sugar 2 times daily or as directed., Disp: 100 strip, Rfl: 5     Calcium Carb-Cholecalciferol 600-800 MG-UNIT TABS, Take 1 tablet by mouth daily, Disp: , Rfl:      clotrimazole (LOTRIMIN) 1 % cream, Apply topically 2 times daily, Disp: 40 g, Rfl: 2     cyanocobalamin (VITAMIN B12) 1000 MCG/ML injection, Inject 1 mL (1,000 mcg) into the muscle every 30 days, Disp: 1 mL, Rfl: 11     ferrous sulfate (IRON) 325 (65 FE) MG tablet, Take 325 mg by mouth daily (with breakfast), Disp: , Rfl:      glipiZIDE (GLUCOTROL XL) 2.5 MG 24 hr tablet, TAKE 1 TABLET (2.5 MG) BY MOUTH DAILY, Disp: 90 tablet, Rfl: 0     glipiZIDE (GLUCOTROL XL) 2.5 MG 24 hr tablet, 2.5 mg, Disp: , Rfl: 0     IBUPROFEN PO, Take 400 mg by mouth daily, Disp: , Rfl:      insulin glargine (LANTUS SOLOSTAR) 100 UNIT/ML PEN, Inject 21 units nightly., Disp: 3 Month, Rfl: 1     insulin syringe-needle U-100 (BD INSULIN SYRINGE ULTRAFINE) 30G X 1/2\" " 1 ML, Use one syringe monthly to inject B12., Disp: 12 each, Rfl: 1     levothyroxine (SYNTHROID, LEVOTHROID) 125 MCG tablet, Take 125 mcg by mouth daily, Disp: , Rfl:      lisinopril (PRINIVIL,ZESTRIL) 40 MG tablet, Take 1 tablet (40 mg) by mouth daily, Disp: , Rfl:      metFORMIN (GLUCOPHAGE) 1000 MG tablet, Take 1,000 mg by mouth 2 times daily (with meals), Disp: , Rfl:      metFORMIN (GLUCOPHAGE) 500 MG tablet, TAKE 2 TABLETS (1,000 MG) BY MOUTH 2 TIMES DAILY (WITH MEALS), Disp: 180 tablet, Rfl: 0     ORDER FOR DME, Equipment being ordered: bilateral wrist splints, Disp: 2 each, Rfl: 0     simvastatin (ZOCOR) 5 MG tablet, TAKE 2 TABLETS BY MOUTH AT BEDTIME, Disp: 90 tablet, Rfl: 0    Allergies:   Allergies   Allergen Reactions     Atenolol      PN: headache  PN: headache     Atorvastatin Other (See Comments)     Headaches     Exenatide      PN: LW Reaction: depressed  PN: LW Reaction: depressed     Hydrochlorothiazide W/Triamterene      PN: cramp  PN: cramp     Ranitidine      PN: burns stomach  PN: burns stomach       Social Hx:  .  reports that she has never smoked. She has never used smokeless tobacco. She reports that she drinks alcohol. She reports that she does not use illicit drugs.    Family Hx: family history includes Diabetes in her paternal grandfather; Hypertension in her sister and son; Thyroid Disease in her maternal grandmother..    REVIEW OF SYSTEMS:  CONSTITUTIONAL:NEGATIVE for fever, chills, change in weight  INTEGUMENTARY/SKIN: NEGATIVE for worrisome rashes, moles or lesions  MUSCULOSKELETAL:See HPI above  NEURO: NEGATIVE for weakness, dizziness or paresthesias    This document serves as a record of the services and decisions personally performed and made by Ron Robert MD. It was created on his behalf by Danielle Feliz, a trained medical scribe. The creation of this document is based the provider's statements to the medical scribe.    Farhat Feliz 11:25 AM 10/26/2018       PHYSICAL  "EXAM:  /77  Pulse 80  Resp 12  Ht 1.689 m (5' 6.5\")  Wt 91.6 kg (202 lb)  SpO2 99%  BMI 32.12 kg/m2   GENERAL APPEARANCE: healthy, alert, no distress  SKIN: no suspicious lesions or rashes  NEURO: Normal strength and tone, mentation intact and speech normal  PSYCH:  mentation appears normal and affect normal, not anxious  RESPIRATORY: No increased work of breathing.  VASCULAR: Radial pulses 2+ and brisk cappillary refill     MUSCULOSKELETAL:    RIGHT UPPER EXTREMITY:  Sensation intact to light touch in median, radial, ulnar and axillary nerve distributions  Palpable 2+ radial pulse, brisk capillary refill to all fingers, wwp  Intact epl fpl fdp edc wrist flexion/extension biceps triceps deltoid    RIGHT SHOULDER:  Removed sling during examination  Shoulder Inspection: mild swelling Resolving ecchymosis of distal arm/elbow. No erythema.  Tender: diffuse about the shoulder.  Range of Motion: not tested  Strength: not tested    X-RAY INTERPRETATION: 2 views right shoulder obtained 10/26/2018 were reviewed personally in clinic today with the patient. On my review, there is a minimally displaced fracture of the proximal humerus neck, mild impaction and comminution. Early callus formation seen. Mild acromio-clavicular degenerative changes. Grossly stable alignment to xrays 10/18/2018.      ASSESSMENT: Ava Goddard is a 67 year old female, right -hand dominant with a minimally displaced right proximal humerus fracture.    PLAN:  * reviewed today's xrays with patient. Based on the xrays, the fracture is minimally/mildly displaced and in acceptable, stable alignment.    * Immobilization: continue to be in sling, 24/7. OK to take off for hygiene, gentle range of motion of shoulder. Pendulums demonstrated. Continue sling until we see her back otherwise.  * non weight bearing, strict.  * Rest  * Activity modification - avoid activities that aggravate symptoms.  * Explained to patient that she is not cleared to " drive until her next visit.   * NSAIDS - regular use for inflammation, with food, as long as no contra-indications. Please discuss with pcp if needed.  * Ice twice daily to three times daily.  * Elevation of extremity to reduce swelling  * Tylenol as needed for pain  * return to clinic in 3 weeks, AP/Y views right shoulder.    * I advised her that it wouldn't be safe for her to drive at this time.       The information in this document, created by a scribe for me, accurately reflects the services I personally performed and the decisions made by me. I have reviewed and approved this document for accuracy.     Ron Robert M.D., M.S.  Dept. of Orthopaedic Surgery  [facility]

## 2018-10-26 NOTE — MR AVS SNAPSHOT
"              After Visit Summary   10/26/2018    Ava Goddard    MRN: [id number]           Patient Information     Date Of Birth          1950        Visit Information        Provider Department      10/26/2018 10:00 AM Ron Robert MD [facility]        Today's Diagnoses     Closed fracture of neck of right humerus with routine healing, subsequent encounter    -  1       Follow-ups after your visit        Follow-up notes from your care team     Return in about 3 weeks (around 11/16/2018) for Fracture recheck.      Your next 10 appointments already scheduled     Nov 14, 2018 10:00 AM CST   Return Visit with Ron Robert MD   [facility] ([facility])    [address] [address] [phone]              Who to contact     If you have questions or need follow up information about today's clinic visit or your schedule please contact [facility] directly at [phone].  Normal or non-critical lab and imaging results will be communicated to you by MyChart, letter or phone within 4 business days after the clinic has received the results. If you do not hear from us within 7 days, please contact the clinic through [employer] or phone. If you have a critical or abnormal lab result, we will notify you by phone as soon as possible.  Submit refill requests through [employer] or call your pharmacy and they will forward the refill request to us. Please allow 3 business days for your refill to be completed.          Additional Information About Your Visit        Care EveryWhere ID     This is your Care EveryWhere ID. This could be used by other organizations to access your [place] medical records  [id number]        Your Vitals Were     Pulse Respirations Height Pulse Oximetry BMI (Body Mass Index)       80 12 5' 6.5\" (1.689 m) 99% 32.12 kg/m2        Blood Pressure from Last 3 Encounters:   10/26/18 124/77   10/18/18 157/82   10/03/18 " 146/84    Weight from Last 3 Encounters:   10/26/18 202 lb (91.6 kg)   10/18/18 202 lb (91.6 kg)   10/03/18 202 lb (91.6 kg)               Primary Care Provider Office Phone # Fax #    ROBYN Lr [facility] [phone]       [address] [address]        Equal Access to Services     PASQUALE SHER : Hadii aad ku hadasho Soomaali, waaxda luqadaha, qaybta kaalmada adeegyada, waxay idiin hayaan adeeg kharash la'aan . So [facility] [phone].    ATENCIÓN: Si habla espdaniel, tiene a tamayo disposición servicios gratuitos de asistencia lingüística. Libertyame al [phone].    We comply with applicable federal civil rights laws and [place] laws. We do not discriminate on the basis of race, color, national origin, age, disability, sex, sexual orientation, or gender identity.            Thank you!     Thank you for choosing [facility]  for your care. Our goal is always to provide you with excellent care. Hearing back from our patients is one way we can continue to improve our services. Please take a few minutes to complete the written survey that you may receive in the mail after your visit with us. Thank you!             Your Updated Medication List - Protect others around you: Learn how to safely use, store and throw away your medicines at www.disposemymeds.org.          This list is accurate as of 10/26/18  1:32 PM.  Always use your most recent med list.                   Brand Name Dispense Instructions for use Diagnosis    ACCU-CHEK COMPLETE Kit     1 each    1 each 2 times daily    Type 2 diabetes, HbA1c goal < 7% (H)       ASPIRIN PO      Take 81 mg by mouth daily        blood glucose monitoring test strip    no brand specified    100 strip    Use to test blood sugar 2 times daily or as directed.    Type 2 diabetes, HbA1c goal < 7% (H)       Calcium Carb-Cholecalciferol 600-800 MG-UNIT Tabs      Take 1 tablet by mouth daily        clotrimazole 1 % cream    LOTRIMIN     "40 g    Apply topically 2 times daily    Onychomycosis       cyanocobalamin 1000 MCG/ML injection    VITAMIN B12    1 mL    Inject 1 mL (1,000 mcg) into the muscle every 30 days    Vitamin B12 deficiency       ferrous sulfate 325 (65 Fe) MG tablet    IRON     Take 325 mg by mouth daily (with breakfast)        * glipiZIDE 2.5 MG 24 hr tablet    GLUCOTROL XL     2.5 mg        * glipiZIDE 2.5 MG 24 hr tablet    GLUCOTROL XL    90 tablet    TAKE 1 TABLET (2.5 MG) BY MOUTH DAILY    Diabetes mellitus, type 2 (H)       IBUPROFEN PO      Take 400 mg by mouth daily        insulin glargine 100 UNIT/ML injection    LANTUS SOLOSTAR    3 Month    Inject 21 units nightly.    Type 2 diabetes, HbA1c goal < 7% (H)       insulin syringe-needle U-100 30G X 1/2\" 1 ML    BD insulin syringe ultrafine    12 each    Use one syringe monthly to inject B12.    Vitamin B12 deficiency       levothyroxine 125 MCG tablet    SYNTHROID/LEVOTHROID     Take 125 mcg by mouth daily        lisinopril 40 MG tablet    PRINIVIL/ZESTRIL     Take 1 tablet (40 mg) by mouth daily        * metFORMIN 1000 MG tablet    GLUCOPHAGE     Take 1,000 mg by mouth 2 times daily (with meals)        * metFORMIN 500 MG tablet    GLUCOPHAGE    180 tablet    TAKE 2 TABLETS (1,000 MG) BY MOUTH 2 TIMES DAILY (WITH MEALS)    Type 2 diabetes, HbA1c goal < 7% (H)       order for DME     2 each    Equipment being ordered: bilateral wrist splints    Chronic thumb pain, unspecified laterality       simvastatin 5 MG tablet    ZOCOR    90 tablet    TAKE 2 TABLETS BY MOUTH AT BEDTIME    Other and unspecified hyperlipidemia       * Notice:  This list has 4 medication(s) that are the same as other medications prescribed for you. Read the directions carefully, and ask your doctor or other care provider to review them with you.      "

## 2018-10-26 NOTE — LETTER
10/26/2018         RE: Ava Goddard  [address]  [facility] [address]        Dear Colleague,    Thank you for referring your patient, Ava Goddard, to the [facility]. Please see a copy of my visit note below.    CHIEF COMPLAINT:   Chief Complaint   Patient presents with     Right Shoulder - RECHECK     Trauma     follow up from fall 3 weeks ago. patient is feeling a lot better     INJURY: proximal humerus fracture, right.  DATE of INJURY: 10/3/2018      HISTORY:  Ava Goddard is a 67 year old female, right -hand dominant, seen for fracture followup right proximal humerus fracture on 10/3/2018. She was walking to the bathroom, felt lightheaded and tripped over her dog, falling onto the right shoulder. Was seen at [facility] with xrays showing proximal humerus fracture. Was seen by us and will be treated nonsurgically. Was seen by Dr. Ramos on 10/18/2018 for follow up as we were out of office for fracture recheck and was doing well at that time. It has been 3 weeks since the injury.  No pain today, 0/10. Remained in sling. Would like to return to work asap.      Onset: following acute injury: fall  Symptoms have been improving since that time.  Aggravated by: with shoulder range of motion and palpation.  Relieved by: at rest, with ibuprofen briefly  Present symptoms: pain with any shoulder range of motion and palpation  Pain location: lateral shoulder, proximal arm  Pain severity: 0/10  Pain quality: sharp  Frequency of symptoms: frequently  Associated symptoms: none    Treatment up to this point: sling, aspirin, ibuprofen (declined pain medications at emergency room)  Prior history of related problems: no prior problems with this area in the past    Significant Orthopedic past medical history: none  Usual level of recreational activity: sedentary  Usual level of work activity: sedentary -     Other PMH:  has a past medical history of Diabetes (H); Hypertension; and  Thyroid disease.  Patient Active Problem List    Diagnosis Date Noted     Rosacea 2015     Priority: Medium     Thyroid nodule 2015     Priority: Medium     Type 2 diabetes mellitus with hyperglycemia (H) 2015     Priority: Medium     Hypothyroidism 2015     Priority: Medium     B12 deficiency 2015     Priority: Medium     S/P gastric bypass 2015     Priority: Medium     Iron deficiency anemia 2015     Priority: Medium     Osteoporosis 2015     Priority: Medium     Problem list name updated by automated process. Provider to review       Hyperlipidemia with target LDL less than 100 2015     Priority: Medium     Diagnosis updated by automated process. Provider to review and confirm.       Hypertension goal BP (blood pressure) < 140/90 2015     Priority: Medium       Surgical Hx:  has a past surgical history that includes  section (1975); TOTAL KNEE ARTHROPLASTY (Left); gastric bypass; Right Ankle ORIF; cataract iol, rt/lt; and tubal ligation.    Medications:   Current Outpatient Prescriptions:      ASPIRIN PO, Take 81 mg by mouth daily, Disp: , Rfl:      Blood Glucose Monitoring Suppl (ACCU-CHEK COMPLETE) KIT, 1 each 2 times daily, Disp: 1 each, Rfl: 0     blood glucose test strip, Use to test blood sugar 2 times daily or as directed., Disp: 100 strip, Rfl: 5     Calcium Carb-Cholecalciferol 600-800 MG-UNIT TABS, Take 1 tablet by mouth daily, Disp: , Rfl:      clotrimazole (LOTRIMIN) 1 % cream, Apply topically 2 times daily, Disp: 40 g, Rfl: 2     cyanocobalamin (VITAMIN B12) 1000 MCG/ML injection, Inject 1 mL (1,000 mcg) into the muscle every 30 days, Disp: 1 mL, Rfl: 11     ferrous sulfate (IRON) 325 (65 FE) MG tablet, Take 325 mg by mouth daily (with breakfast), Disp: , Rfl:      glipiZIDE (GLUCOTROL XL) 2.5 MG 24 hr tablet, TAKE 1 TABLET (2.5 MG) BY MOUTH DAILY, Disp: 90 tablet, Rfl: 0     glipiZIDE (GLUCOTROL XL) 2.5 MG 24 hr tablet, 2.5 mg,  "Disp: , Rfl: 0     IBUPROFEN PO, Take 400 mg by mouth daily, Disp: , Rfl:      insulin glargine (LANTUS SOLOSTAR) 100 UNIT/ML PEN, Inject 21 units nightly., Disp: 3 Month, Rfl: 1     insulin syringe-needle U-100 (BD INSULIN SYRINGE ULTRAFINE) 30G X 1/2\" 1 ML, Use one syringe monthly to inject B12., Disp: 12 each, Rfl: 1     levothyroxine (SYNTHROID, LEVOTHROID) 125 MCG tablet, Take 125 mcg by mouth daily, Disp: , Rfl:      lisinopril (PRINIVIL,ZESTRIL) 40 MG tablet, Take 1 tablet (40 mg) by mouth daily, Disp: , Rfl:      metFORMIN (GLUCOPHAGE) 1000 MG tablet, Take 1,000 mg by mouth 2 times daily (with meals), Disp: , Rfl:      metFORMIN (GLUCOPHAGE) 500 MG tablet, TAKE 2 TABLETS (1,000 MG) BY MOUTH 2 TIMES DAILY (WITH MEALS), Disp: 180 tablet, Rfl: 0     ORDER FOR DME, Equipment being ordered: bilateral wrist splints, Disp: 2 each, Rfl: 0     simvastatin (ZOCOR) 5 MG tablet, TAKE 2 TABLETS BY MOUTH AT BEDTIME, Disp: 90 tablet, Rfl: 0    Allergies:   Allergies   Allergen Reactions     Atenolol      PN: headache  PN: headache     Atorvastatin Other (See Comments)     Headaches     Exenatide      PN: LW Reaction: depressed  PN: LW Reaction: depressed     Hydrochlorothiazide W/Triamterene      PN: cramp  PN: cramp     Ranitidine      PN: burns stomach  PN: burns stomach       Social Hx:  .  reports that she has never smoked. She has never used smokeless tobacco. She reports that she drinks alcohol. She reports that she does not use illicit drugs.    Family Hx: family history includes Diabetes in her paternal grandfather; Hypertension in her sister and son; Thyroid Disease in her maternal grandmother..    REVIEW OF SYSTEMS:  CONSTITUTIONAL:NEGATIVE for fever, chills, change in weight  INTEGUMENTARY/SKIN: NEGATIVE for worrisome rashes, moles or lesions  MUSCULOSKELETAL:See HPI above  NEURO: NEGATIVE for weakness, dizziness or paresthesias    This document serves as a record of the services and decisions personally " "performed and made by Ron Robert MD. It was created on his behalf by Danielle Feliz, a trained medical scribe. The creation of this document is based the provider's statements to the medical scribe.    Scribbroderick Feliz 11:25 AM 10/26/2018       PHYSICAL EXAM:  /77  Pulse 80  Resp 12  Ht 1.689 m (5' 6.5\")  Wt 91.6 kg (202 lb)  SpO2 99%  BMI 32.12 kg/m2   GENERAL APPEARANCE: healthy, alert, no distress  SKIN: no suspicious lesions or rashes  NEURO: Normal strength and tone, mentation intact and speech normal  PSYCH:  mentation appears normal and affect normal, not anxious  RESPIRATORY: No increased work of breathing.  VASCULAR: Radial pulses 2+ and brisk cappillary refill     MUSCULOSKELETAL:    RIGHT UPPER EXTREMITY:  Sensation intact to light touch in median, radial, ulnar and axillary nerve distributions  Palpable 2+ radial pulse, brisk capillary refill to all fingers, wwp  Intact epl fpl fdp edc wrist flexion/extension biceps triceps deltoid    RIGHT SHOULDER:  Removed sling during examination  Shoulder Inspection: mild swelling Resolving ecchymosis of distal arm/elbow. No erythema.  Tender: diffuse about the shoulder.  Range of Motion: not tested  Strength: not tested    X-RAY INTERPRETATION: 2 views right shoulder obtained 10/26/2018 were reviewed personally in clinic today with the patient. On my review, there is a minimally displaced fracture of the proximal humerus neck, mild impaction and comminution. Early callus formation seen. Mild acromio-clavicular degenerative changes. Grossly stable alignment to xrays 10/18/2018.      ASSESSMENT: Ava Goddard is a 67 year old female, right -hand dominant with a minimally displaced right proximal humerus fracture.    PLAN:  * reviewed today's xrays with patient. Based on the xrays, the fracture is minimally/mildly displaced and in acceptable, stable alignment.    * Immobilization: continue to be in sling, 24/7. OK to take off for hygiene, gentle range " of motion of shoulder. Pendulums demonstrated. Continue sling until we see her back otherwise.  * non weight bearing, strict.  * Rest  * Activity modification - avoid activities that aggravate symptoms.  * Explained to patient that she is not cleared to drive until her next visit.   * NSAIDS - regular use for inflammation, with food, as long as no contra-indications. Please discuss with pcp if needed.  * Ice twice daily to three times daily.  * Elevation of extremity to reduce swelling  * Tylenol as needed for pain  * return to clinic in 3 weeks, AP/Y views right shoulder.    * I advised her that it wouldn't be safe for her to drive at this time.       The information in this document, created by a scribe for me, accurately reflects the services I personally performed and the decisions made by me. I have reviewed and approved this document for accuracy.     Ron Robert M.D., M.S.  Dept. of Orthopaedic Surgery  [facility]      Again, thank you for allowing me to participate in the care of your patient.        Sincerely,        Ron Robert MD

## 2018-11-14 ENCOUNTER — OFFICE VISIT (OUTPATIENT)
Dept: ORTHOPEDICS | Facility: CLINIC | Age: 68
End: 2018-11-14
Payer: COMMERCIAL

## 2018-11-14 ENCOUNTER — RADIANT APPOINTMENT (OUTPATIENT)
Dept: GENERAL RADIOLOGY | Facility: CLINIC | Age: 68
End: 2018-11-14
Attending: ORTHOPAEDIC SURGERY
Payer: COMMERCIAL

## 2018-11-14 VITALS
BODY MASS INDEX: 31.71 KG/M2 | DIASTOLIC BLOOD PRESSURE: 82 MMHG | HEART RATE: 106 BPM | HEIGHT: 67 IN | WEIGHT: 202 LBS | SYSTOLIC BLOOD PRESSURE: 136 MMHG | OXYGEN SATURATION: 99 %

## 2018-11-14 DIAGNOSIS — S42.211A: ICD-10-CM

## 2018-11-14 DIAGNOSIS — S42.211D CLOSED FRACTURE OF NECK OF RIGHT HUMERUS WITH ROUTINE HEALING, SUBSEQUENT ENCOUNTER: Primary | ICD-10-CM

## 2018-11-14 PROCEDURE — 73030 X-RAY EXAM OF SHOULDER: CPT | Mod: RT

## 2018-11-14 PROCEDURE — 99207 ZZC FRACTURE CARE IN GLOBAL PERIOD: CPT | Performed by: ORTHOPAEDIC SURGERY

## 2018-11-14 RX ORDER — LIRAGLUTIDE 6 MG/ML
1.2 INJECTION SUBCUTANEOUS
COMMUNITY
Start: 2018-10-12

## 2018-11-14 RX ORDER — PIOGLITAZONEHYDROCHLORIDE 15 MG/1
15 TABLET ORAL
COMMUNITY
Start: 2018-10-12

## 2018-11-14 ASSESSMENT — PAIN SCALES - GENERAL: PAINLEVEL: NO PAIN (0)

## 2018-11-14 NOTE — PROGRESS NOTES
CHIEF COMPLAINT:   Chief Complaint   Patient presents with     Right Shoulder - RECHECK     RECHECK     right shoulder follow up needs work note to go back to work pt has no pain today     INJURY: proximal humerus fracture, right.  DATE of INJURY: 10/3/2018      HISTORY:  Ava Goddard is a 67 year old female, right -hand dominant, seen for fracture followup right proximal humerus fracture on 10/3/2018. She was walking to the bathroom, felt lightheaded and tripped over her dog, falling onto the right shoulder. Was seen at [facility] with xrays showing proximal humerus fracture. Was seen by us and will be treated nonsurgically. It has been 6 weeks since the injury.  No pain today, 0/10. Remained in sling. She has been non-compliant with restrictions of driving and taking it easy. Does note she has not been doing any lifting. Would like to go back to work. Recall that she cannot do physical therapy due to financial reasons.       Onset: following acute injury: fall  Symptoms have been improving since that time.  Aggravated by: with shoulder range of motion and palpation.  Relieved by: at rest, with ibuprofen briefly  Present symptoms: pain with any shoulder range of motion and palpation  Pain location: lateral shoulder, proximal arm  Pain severity: 0/10  Pain quality: sharp  Frequency of symptoms: frequently  Associated symptoms: none    Treatment up to this point: sling, aspirin, ibuprofen (declined pain medications at emergency room)  Prior history of related problems: no prior problems with this area in the past    Significant Orthopedic past medical history: none  Usual level of recreational activity: sedentary  Usual level of work activity: sedentary -     Other PMH:  has a past medical history of Diabetes (H); Hypertension; and Thyroid disease.  Patient Active Problem List    Diagnosis Date Noted     Rosacea 03/19/2015     Priority: Medium     Thyroid nodule 03/19/2015     Priority: Medium     Type 2  "diabetes mellitus with hyperglycemia (H) 2015     Priority: Medium     Hypothyroidism 2015     Priority: Medium     B12 deficiency 2015     Priority: Medium     S/P gastric bypass 2015     Priority: Medium     Iron deficiency anemia 2015     Priority: Medium     Osteoporosis 2015     Priority: Medium     Problem list name updated by automated process. Provider to review       Hyperlipidemia with target LDL less than 100 2015     Priority: Medium     Diagnosis updated by automated process. Provider to review and confirm.       Hypertension goal BP (blood pressure) < 140/90 2015     Priority: Medium       Surgical Hx:  has a past surgical history that includes  section (); TOTAL KNEE ARTHROPLASTY (Left); gastric bypass; Right Ankle ORIF; cataract iol, rt/lt; and tubal ligation.    Medications:   Current Outpatient Prescriptions:      ASPIRIN PO, Take 81 mg by mouth daily, Disp: , Rfl:      Blood Glucose Monitoring Suppl (ACCU-CHEK COMPLETE) KIT, 1 each 2 times daily, Disp: 1 each, Rfl: 0     blood glucose test strip, Use to test blood sugar 2 times daily or as directed., Disp: 100 strip, Rfl: 5     Calcium Carb-Cholecalciferol 600-800 MG-UNIT TABS, Take 1 tablet by mouth daily, Disp: , Rfl:      clotrimazole (LOTRIMIN) 1 % cream, Apply topically 2 times daily, Disp: 40 g, Rfl: 2     cyanocobalamin (VITAMIN B12) 1000 MCG/ML injection, Inject 1 mL (1,000 mcg) into the muscle every 30 days, Disp: 1 mL, Rfl: 11     ferrous sulfate (IRON) 325 (65 FE) MG tablet, Take 325 mg by mouth daily (with breakfast), Disp: , Rfl:      IBUPROFEN PO, Take 400 mg by mouth daily, Disp: , Rfl:      insulin glargine (LANTUS SOLOSTAR) 100 UNIT/ML pen, Inject 20 Units Subcutaneous, Disp: , Rfl:      insulin glargine (LANTUS SOLOSTAR) 100 UNIT/ML PEN, Inject 21 units nightly., Disp: 3 Month, Rfl: 1     insulin syringe-needle U-100 (BD INSULIN SYRINGE ULTRAFINE) 30G X 1/2\" 1 ML, Use " one syringe monthly to inject B12., Disp: 12 each, Rfl: 1     levothyroxine (SYNTHROID, LEVOTHROID) 125 MCG tablet, Take 125 mcg by mouth daily, Disp: , Rfl:      liraglutide (VICTOZA PEN) 18 MG/3ML soln, Inject 1.2 mg Subcutaneous, Disp: , Rfl:      lisinopril (PRINIVIL,ZESTRIL) 40 MG tablet, Take 1 tablet (40 mg) by mouth daily, Disp: , Rfl:      metFORMIN (GLUCOPHAGE) 1000 MG tablet, Take 1,000 mg by mouth 2 times daily (with meals), Disp: , Rfl:      metFORMIN (GLUCOPHAGE) 500 MG tablet, TAKE 2 TABLETS (1,000 MG) BY MOUTH 2 TIMES DAILY (WITH MEALS), Disp: 180 tablet, Rfl: 0     ORDER FOR DME, Equipment being ordered: bilateral wrist splints, Disp: 2 each, Rfl: 0     pioglitazone (ACTOS) 15 MG tablet, Take 15 mg by mouth, Disp: , Rfl:      simvastatin (ZOCOR) 5 MG tablet, TAKE 2 TABLETS BY MOUTH AT BEDTIME, Disp: 90 tablet, Rfl: 0    Allergies:   Allergies   Allergen Reactions     Atenolol      PN: headache  PN: headache     Atorvastatin Other (See Comments)     Headaches     Exenatide      PN: LW Reaction: depressed  PN: LW Reaction: depressed     Hydrochlorothiazide W/Triamterene      PN: cramp  PN: cramp     Ranitidine      PN: burns stomach  PN: burns stomach       Social Hx:  .  reports that she has never smoked. She has never used smokeless tobacco. She reports that she drinks alcohol. She reports that she does not use illicit drugs.    Family Hx: family history includes Diabetes in her paternal grandfather; Hypertension in her sister and son; Thyroid Disease in her maternal grandmother..    REVIEW OF SYSTEMS:  CONSTITUTIONAL:NEGATIVE for fever, chills, change in weight  INTEGUMENTARY/SKIN: NEGATIVE for worrisome rashes, moles or lesions  MUSCULOSKELETAL:See HPI above  NEURO: NEGATIVE for weakness, dizziness or paresthesias    This document serves as a record of the services and decisions personally performed and made by Ron Robert MD. It was created on his behalf by Danielle Feliz, a trained medical  "scribe. The creation of this document is based the provider's statements to the medical scribe.    Farhat Danielle Feliz 10:18 AM 11/14/2018     PHYSICAL EXAM:  /82 (BP Location: Left arm, Patient Position: Sitting, Cuff Size: Adult Regular)  Pulse 106  Ht 1.689 m (5' 6.5\")  Wt 91.6 kg (202 lb)  SpO2 99%  BMI 32.12 kg/m2   GENERAL APPEARANCE: healthy, alert, no distress  SKIN: no suspicious lesions or rashes  NEURO: Normal strength and tone, mentation intact and speech normal  PSYCH:  mentation appears normal and affect normal, not anxious  RESPIRATORY: No increased work of breathing.  VASCULAR: Radial pulses 2+ and brisk capillary refill     MUSCULOSKELETAL:    RIGHT UPPER EXTREMITY:  Sensation intact to light touch in median, radial, ulnar and axillary nerve distributions  Palpable 2+ radial pulse, brisk capillary refill to all fingers, wwp  Intact epl fpl fdp edc wrist flexion/extension biceps triceps deltoid    RIGHT SHOULDER:  Removed sling during examination  Shoulder Inspection: mild swelling, no ecchymosis. No erythema.  Tender: nontender to palpation   Range of Motion:   Active: forward flexion 80, external rotation 40   Strength: grossly intact but noted weakness    X-RAY INTERPRETATION: 3 views ( internal rotation, external rotation, Y) right shoulder obtained 11/14/2018 were reviewed personally in clinic today with the patient. On my review, there is a minimally displaced fracture of the proximal humerus neck, mild impaction and comminution. further callus formation seen as well as sclerosis. Mild acromio-clavicular degenerative changes. Grossly stable alignment to xrays 10/26/2018.      ASSESSMENT: Ava Goddard is a 67 year old female, right -hand dominant with a healing, right proximal humerus fracture.    PLAN:  * reviewed today's xrays with patient. Fracture alignment stable with further healing, not yet complete.  * glad to hear overall improving. Noted stiffness, weakness on exam.  * I " think she'd benefit from Physical Therapy, but per patient, financially she can't do it. We have given her exercises to work on her own.  * cannot rule out rotator cuff injury given weakness, will monitor.    * Immobilization: none. Work on range of motion.  * light weight bearing at this time, ADLs.  * Rest  * Activity modification - avoid activities that aggravate symptoms.  * Explained to patient that she is not cleared to drive until her next visit.   * NSAIDS - regular use for inflammation, with food, as long as no contra-indications. Please discuss with pcp if needed  * Ice twice daily to three times daily.  * Elevation of extremity to reduce swelling  * Tylenol as needed for pain  * Workability update: Ok to return to work driving.  * return to clinic in 4-6 weeks, internal rotation / external rotation / Y views right shoulder.     * global fracture care.    The information in this document, created by a scribe for me, accurately reflects the services I personally performed and the decisions made by me. I have reviewed and approved this document for accuracy.     Ron Robert M.D., M.S.  Dept. of Orthopaedic Surgery  [facility]

## 2018-11-14 NOTE — LETTER
11/14/2018         RE: Ava Goddard  [address]  [facility] [address]        Dear Colleague,    Thank you for referring your patient, Ava Goddard, to the [facility]. Please see a copy of my visit note below.    CHIEF COMPLAINT:   Chief Complaint   Patient presents with     Right Shoulder - RECHECK     RECHECK     right shoulder follow up needs work note to go back to work pt has no pain today     INJURY: proximal humerus fracture, right.  DATE of INJURY: 10/3/2018      HISTORY:  Ava Goddard is a 67 year old female, right -hand dominant, seen for fracture followup right proximal humerus fracture on 10/3/2018. She was walking to the bathroom, felt lightheaded and tripped over her dog, falling onto the right shoulder. Was seen at [facility] with xrays showing proximal humerus fracture. Was seen by us and will be treated nonsurgically. It has been 6 weeks since the injury.  No pain today, 0/10. Remained in sling. She has been non-compliant with restrictions of driving and taking it easy. Does note she has not been doing any lifting. Would like to go back to work. Recall that she cannot do physical therapy due to financial reasons.       Onset: following acute injury: fall  Symptoms have been improving since that time.  Aggravated by: with shoulder range of motion and palpation.  Relieved by: at rest, with ibuprofen briefly  Present symptoms: pain with any shoulder range of motion and palpation  Pain location: lateral shoulder, proximal arm  Pain severity: 0/10  Pain quality: sharp  Frequency of symptoms: frequently  Associated symptoms: none    Treatment up to this point: sling, aspirin, ibuprofen (declined pain medications at emergency room)  Prior history of related problems: no prior problems with this area in the past    Significant Orthopedic past medical history: none  Usual level of recreational activity: sedentary  Usual level of work activity: sedentary -      Other PMH:  has a past medical history of Diabetes (H); Hypertension; and Thyroid disease.  Patient Active Problem List    Diagnosis Date Noted     Rosacea 2015     Priority: Medium     Thyroid nodule 2015     Priority: Medium     Type 2 diabetes mellitus with hyperglycemia (H) 2015     Priority: Medium     Hypothyroidism 2015     Priority: Medium     B12 deficiency 2015     Priority: Medium     S/P gastric bypass 2015     Priority: Medium     Iron deficiency anemia 2015     Priority: Medium     Osteoporosis 2015     Priority: Medium     Problem list name updated by automated process. Provider to review       Hyperlipidemia with target LDL less than 100 2015     Priority: Medium     Diagnosis updated by automated process. Provider to review and confirm.       Hypertension goal BP (blood pressure) < 140/90 2015     Priority: Medium       Surgical Hx:  has a past surgical history that includes  section (); TOTAL KNEE ARTHROPLASTY (Left); gastric bypass; Right Ankle ORIF; cataract iol, rt/lt; and tubal ligation.    Medications:   Current Outpatient Prescriptions:      ASPIRIN PO, Take 81 mg by mouth daily, Disp: , Rfl:      Blood Glucose Monitoring Suppl (ACCU-CHEK COMPLETE) KIT, 1 each 2 times daily, Disp: 1 each, Rfl: 0     blood glucose test strip, Use to test blood sugar 2 times daily or as directed., Disp: 100 strip, Rfl: 5     Calcium Carb-Cholecalciferol 600-800 MG-UNIT TABS, Take 1 tablet by mouth daily, Disp: , Rfl:      clotrimazole (LOTRIMIN) 1 % cream, Apply topically 2 times daily, Disp: 40 g, Rfl: 2     cyanocobalamin (VITAMIN B12) 1000 MCG/ML injection, Inject 1 mL (1,000 mcg) into the muscle every 30 days, Disp: 1 mL, Rfl: 11     ferrous sulfate (IRON) 325 (65 FE) MG tablet, Take 325 mg by mouth daily (with breakfast), Disp: , Rfl:      IBUPROFEN PO, Take 400 mg by mouth daily, Disp: , Rfl:      insulin glargine (LANTUS  "SOLOSTAR) 100 UNIT/ML pen, Inject 20 Units Subcutaneous, Disp: , Rfl:      insulin glargine (LANTUS SOLOSTAR) 100 UNIT/ML PEN, Inject 21 units nightly., Disp: 3 Month, Rfl: 1     insulin syringe-needle U-100 (BD INSULIN SYRINGE ULTRAFINE) 30G X 1/2\" 1 ML, Use one syringe monthly to inject B12., Disp: 12 each, Rfl: 1     levothyroxine (SYNTHROID, LEVOTHROID) 125 MCG tablet, Take 125 mcg by mouth daily, Disp: , Rfl:      liraglutide (VICTOZA PEN) 18 MG/3ML soln, Inject 1.2 mg Subcutaneous, Disp: , Rfl:      lisinopril (PRINIVIL,ZESTRIL) 40 MG tablet, Take 1 tablet (40 mg) by mouth daily, Disp: , Rfl:      metFORMIN (GLUCOPHAGE) 1000 MG tablet, Take 1,000 mg by mouth 2 times daily (with meals), Disp: , Rfl:      metFORMIN (GLUCOPHAGE) 500 MG tablet, TAKE 2 TABLETS (1,000 MG) BY MOUTH 2 TIMES DAILY (WITH MEALS), Disp: 180 tablet, Rfl: 0     ORDER FOR DME, Equipment being ordered: bilateral wrist splints, Disp: 2 each, Rfl: 0     pioglitazone (ACTOS) 15 MG tablet, Take 15 mg by mouth, Disp: , Rfl:      simvastatin (ZOCOR) 5 MG tablet, TAKE 2 TABLETS BY MOUTH AT BEDTIME, Disp: 90 tablet, Rfl: 0    Allergies:   Allergies   Allergen Reactions     Atenolol      PN: headache  PN: headache     Atorvastatin Other (See Comments)     Headaches     Exenatide      PN: LW Reaction: depressed  PN: LW Reaction: depressed     Hydrochlorothiazide W/Triamterene      PN: cramp  PN: cramp     Ranitidine      PN: burns stomach  PN: burns stomach       Social Hx:  .  reports that she has never smoked. She has never used smokeless tobacco. She reports that she drinks alcohol. She reports that she does not use illicit drugs.    Family Hx: family history includes Diabetes in her paternal grandfather; Hypertension in her sister and son; Thyroid Disease in her maternal grandmother..    REVIEW OF SYSTEMS:  CONSTITUTIONAL:NEGATIVE for fever, chills, change in weight  INTEGUMENTARY/SKIN: NEGATIVE for worrisome rashes, moles or " "lesions  MUSCULOSKELETAL:See HPI above  NEURO: NEGATIVE for weakness, dizziness or paresthesias    This document serves as a record of the services and decisions personally performed and made by Ron Robert MD. It was created on his behalf by Danielle Feliz, a trained medical scribe. The creation of this document is based the provider's statements to the medical scribe.    Scribbroderick Feliz 10:18 AM 11/14/2018     PHYSICAL EXAM:  /82 (BP Location: Left arm, Patient Position: Sitting, Cuff Size: Adult Regular)  Pulse 106  Ht 1.689 m (5' 6.5\")  Wt 91.6 kg (202 lb)  SpO2 99%  BMI 32.12 kg/m2   GENERAL APPEARANCE: healthy, alert, no distress  SKIN: no suspicious lesions or rashes  NEURO: Normal strength and tone, mentation intact and speech normal  PSYCH:  mentation appears normal and affect normal, not anxious  RESPIRATORY: No increased work of breathing.  VASCULAR: Radial pulses 2+ and brisk capillary refill     MUSCULOSKELETAL:    RIGHT UPPER EXTREMITY:  Sensation intact to light touch in median, radial, ulnar and axillary nerve distributions  Palpable 2+ radial pulse, brisk capillary refill to all fingers, wwp  Intact epl fpl fdp edc wrist flexion/extension biceps triceps deltoid    RIGHT SHOULDER:  Removed sling during examination  Shoulder Inspection: mild swelling, no ecchymosis. No erythema.  Tender: nontender to palpation   Range of Motion:   Active: forward flexion 80, external rotation 40   Strength: grossly intact but noted weakness    X-RAY INTERPRETATION: 3 views ( internal rotation, external rotation, Y) right shoulder obtained 11/14/2018 were reviewed personally in clinic today with the patient. On my review, there is a minimally displaced fracture of the proximal humerus neck, mild impaction and comminution. further callus formation seen as well as sclerosis. Mild acromio-clavicular degenerative changes. Grossly stable alignment to xrays 10/26/2018.      ASSESSMENT: Ava Goddard is a 67 " year old female, right -hand dominant with a healing, right proximal humerus fracture.    PLAN:  * reviewed today's xrays with patient. Fracture alignment stable with further healing, not yet complete.  * glad to hear overall improving. Noted stiffness, weakness on exam.  * I think she'd benefit from Physical Therapy, but per patient, financially she can't do it. We have given her exercises to work on her own.  * cannot rule out rotator cuff injury given weakness, will monitor.    * Immobilization: none. Work on range of motion.  * light weight bearing at this time, ADLs.  * Rest  * Activity modification - avoid activities that aggravate symptoms.  * Explained to patient that she is not cleared to drive until her next visit.   * NSAIDS - regular use for inflammation, with food, as long as no contra-indications. Please discuss with pcp if needed  * Ice twice daily to three times daily.  * Elevation of extremity to reduce swelling  * Tylenol as needed for pain  * Workability update: Ok to return to work driving.  * return to clinic in 4-6 weeks, internal rotation / external rotation / Y views right shoulder.     * global fracture care.    The information in this document, created by a scribe for me, accurately reflects the services I personally performed and the decisions made by me. I have reviewed and approved this document for accuracy.     Ron Robert M.D., M.S.  Dept. of Orthopaedic Surgery  [facility]      Again, thank you for allowing me to participate in the care of your patient.        Sincerely,        Ron Robert MD

## 2018-11-14 NOTE — LETTER
November 14, 2018      Ava Goddard  [address]  [facility] [address]        To Whom It May Concern:    Ava Goddard was seen in our clinic. She may return to work 11/14/18 without restrictions. She may continue to drive. If you have any other questions please call our office.      Sincerely,        Ron Robert MD

## 2018-11-14 NOTE — MR AVS SNAPSHOT
"              After Visit Summary   11/14/2018    Ava Goddard    MRN: [id number]           Patient Information     Date Of Birth          1950        Visit Information        Provider Department      11/14/2018 10:00 AM Ron Robert MD [facility]        Today's Diagnoses     Closed fracture of neck of right humerus with routine healing, subsequent encounter    -  1       Follow-ups after your visit        Follow-up notes from your care team     Return in about 4 weeks (around 12/12/2018) for Fracture recheck.      Your next 10 appointments already scheduled     Dec 12, 2018 10:00 AM CST   Return Visit with Ron Robert MD   [facility] ([facility])    [address] [address] [phone]              Who to contact     If you have questions or need follow up information about today's clinic visit or your schedule please contact [facility] directly at [phone].  Normal or non-critical lab and imaging results will be communicated to you by MyChart, letter or phone within 4 business days after the clinic has received the results. If you do not hear from us within 7 days, please contact the clinic through [employer] or phone. If you have a critical or abnormal lab result, we will notify you by phone as soon as possible.  Submit refill requests through [employer] or call your pharmacy and they will forward the refill request to us. Please allow 3 business days for your refill to be completed.          Additional Information About Your Visit        Care EveryWhere ID     This is your Care EveryWhere ID. This could be used by other organizations to access your [place] medical records  [id number]        Your Vitals Were     Pulse Height Pulse Oximetry BMI (Body Mass Index)          106 5' 6.5\" (1.689 m) 99% 32.12 kg/m2         Blood Pressure from Last 3 Encounters:   11/14/18 136/82   10/26/18 124/77   10/18/18 157/82    " Weight from Last 3 Encounters:   11/14/18 202 lb (91.6 kg)   10/26/18 202 lb (91.6 kg)   10/18/18 202 lb (91.6 kg)                 Today's Medication Changes          These changes are accurate as of 11/14/18 12:51 PM.  If you have any questions, ask your nurse or doctor.               Stop taking these medicines if you haven't already. Please contact your care team if you have questions.     glipiZIDE 2.5 MG 24 hr tablet   Commonly known as:  GLUCOTROL XL   Stopped by:  Ron Robert MD                    Primary Care Provider Office Phone # Fax #    Donita Lantigua Hao Horton, ROBYN [facility] [phone] [address] [address]        Equal Access to Services     PASQUALE SHER : Hadii shantell bassett hadasho Soomaali, waaxda luqadaha, qaybta kaalmada adeegyada, kristofer mann hayuriel villafana . So [facility] [phone].    ATENCIÓN: Si habla español, tiene a tamayo disposición servicios gratuitos de asistencia lingüística. Llame al [phone].    We comply with applicable federal civil rights laws and [place] laws. We do not discriminate on the basis of race, color, national origin, age, disability, sex, sexual orientation, or gender identity.            Thank you!     Thank you for choosing [facility]  for your care. Our goal is always to provide you with excellent care. Hearing back from our patients is one way we can continue to improve our services. Please take a few minutes to complete the written survey that you may receive in the mail after your visit with us. Thank you!             Your Updated Medication List - Protect others around you: Learn how to safely use, store and throw away your medicines at www.disposemymeds.org.          This list is accurate as of 11/14/18 12:51 PM.  Always use your most recent med list.                   Brand Name Dispense Instructions for use Diagnosis    ACCU-CHEK COMPLETE Kit     1 each    1 each 2 times daily    Type 2 diabetes,  "HbA1c goal < 7% (H)       ASPIRIN PO      Take 81 mg by mouth daily        blood glucose monitoring test strip    no brand specified    100 strip    Use to test blood sugar 2 times daily or as directed.    Type 2 diabetes, HbA1c goal < 7% (H)       Calcium Carb-Cholecalciferol 600-800 MG-UNIT Tabs      Take 1 tablet by mouth daily        clotrimazole 1 % cream    LOTRIMIN    40 g    Apply topically 2 times daily    Onychomycosis       cyanocobalamin 1000 MCG/ML injection    VITAMIN B12    1 mL    Inject 1 mL (1,000 mcg) into the muscle every 30 days    Vitamin B12 deficiency       ferrous sulfate 325 (65 Fe) MG tablet    IRON     Take 325 mg by mouth daily (with breakfast)        IBUPROFEN PO      Take 400 mg by mouth daily        * insulin glargine 100 UNIT/ML injection    LANTUS SOLOSTAR    3 Month    Inject 21 units nightly.    Type 2 diabetes, HbA1c goal < 7% (H)       * LANTUS SOLOSTAR 100 UNIT/ML injection   Generic drug:  insulin glargine      Inject 20 Units Subcutaneous        insulin syringe-needle U-100 30G X 1/2\" 1 ML    BD insulin syringe ultrafine    12 each    Use one syringe monthly to inject B12.    Vitamin B12 deficiency       levothyroxine 125 MCG tablet    SYNTHROID/LEVOTHROID     Take 125 mcg by mouth daily        lisinopril 40 MG tablet    PRINIVIL/ZESTRIL     Take 1 tablet (40 mg) by mouth daily        * metFORMIN 1000 MG tablet    GLUCOPHAGE     Take 1,000 mg by mouth 2 times daily (with meals)        * metFORMIN 500 MG tablet    GLUCOPHAGE    180 tablet    TAKE 2 TABLETS (1,000 MG) BY MOUTH 2 TIMES DAILY (WITH MEALS)    Type 2 diabetes, HbA1c goal < 7% (H)       order for DME     2 each    Equipment being ordered: bilateral wrist splints    Chronic thumb pain, unspecified laterality       pioglitazone 15 MG tablet    ACTOS     Take 15 mg by mouth        simvastatin 5 MG tablet    ZOCOR    90 tablet    TAKE 2 TABLETS BY MOUTH AT BEDTIME    Other and unspecified hyperlipidemia       VICTOZA " PEN 18 MG/3ML soln   Generic drug:  liraglutide      Inject 1.2 mg Subcutaneous        * Notice:  This list has 4 medication(s) that are the same as other medications prescribed for you. Read the directions carefully, and ask your doctor or other care provider to review them with you.

## 2018-12-17 ENCOUNTER — ANCILLARY PROCEDURE (OUTPATIENT)
Dept: GENERAL RADIOLOGY | Facility: CLINIC | Age: 68
End: 2018-12-17
Attending: PHYSICIAN ASSISTANT
Payer: COMMERCIAL

## 2018-12-17 ENCOUNTER — OFFICE VISIT (OUTPATIENT)
Dept: ORTHOPEDICS | Facility: CLINIC | Age: 68
End: 2018-12-17
Payer: COMMERCIAL

## 2018-12-17 VITALS
WEIGHT: 202 LBS | HEIGHT: 67 IN | BODY MASS INDEX: 31.71 KG/M2 | OXYGEN SATURATION: 96 % | HEART RATE: 75 BPM | DIASTOLIC BLOOD PRESSURE: 84 MMHG | SYSTOLIC BLOOD PRESSURE: 128 MMHG

## 2018-12-17 DIAGNOSIS — S42.211D: ICD-10-CM

## 2018-12-17 DIAGNOSIS — S42.211D: Primary | ICD-10-CM

## 2018-12-17 PROCEDURE — 73030 X-RAY EXAM OF SHOULDER: CPT | Mod: RT

## 2018-12-17 PROCEDURE — 99207 ZZC FRACTURE CARE IN GLOBAL PERIOD: CPT | Performed by: ORTHOPAEDIC SURGERY

## 2018-12-17 RX ORDER — GLIPIZIDE 5 MG/1
5 TABLET ORAL
COMMUNITY

## 2018-12-17 ASSESSMENT — PAIN SCALES - GENERAL: PAINLEVEL: NO PAIN (0)

## 2018-12-17 ASSESSMENT — MIFFLIN-ST. JEOR: SCORE: 1460.96

## 2018-12-17 NOTE — PROGRESS NOTES
CHIEF COMPLAINT:   Chief Complaint   Patient presents with     Right Shoulder - RECHECK     RECHECK     right shoulder fracture      INJURY: proximal humerus fracture, right.  DATE of INJURY: 10/3/2018      HISTORY:  Ava Goddard is a 68 year old female, right -hand dominant, seen for fracture followup right proximal humerus fracture on 10/3/2018. She was walking to the bathroom, felt lightheaded and tripped over her dog, falling onto the right shoulder. Was seen at [facility] with xrays showing proximal humerus fracture. Was seen by us and will be treated nonsurgically. It has been 11 weeks since the initial injury. No pain today, rated a 0/10. Motion is improving. Has returned to all activities per comfort. She denies any problems or concerns. Again, has not done any formal physical therapy due to financial reasons. Has returned to work without problems.    Onset: following acute injury: fall  Symptoms have been improving since that time.  Aggravated by: with shoulder range of motion and palpation.  Relieved by: at rest, with ibuprofen briefly  Present symptoms: pain with any shoulder range of motion and palpation  Pain location: lateral shoulder, proximal arm  Pain severity: 0/10  Pain quality: sharp  Frequency of symptoms: frequently  Associated symptoms: none    Treatment up to this point: sling, aspirin, ibuprofen (declined pain medications at emergency room)  Prior history of related problems: no prior problems with this area in the past    Significant Orthopedic past medical history: none  Usual level of recreational activity: sedentary  Usual level of work activity: sedentary -     Other PMH:  has a past medical history of Diabetes (H), Hypertension, and Thyroid disease.  Patient Active Problem List    Diagnosis Date Noted     Rosacea 03/19/2015     Priority: Medium     Thyroid nodule 03/19/2015     Priority: Medium     Type 2 diabetes mellitus with hyperglycemia (H) 02/05/2015     Priority:  Medium     Hypothyroidism 2015     Priority: Medium     B12 deficiency 2015     Priority: Medium     S/P gastric bypass 2015     Priority: Medium     Iron deficiency anemia 2015     Priority: Medium     Osteoporosis 2015     Priority: Medium     Problem list name updated by automated process. Provider to review       Hyperlipidemia with target LDL less than 100 2015     Priority: Medium     Diagnosis updated by automated process. Provider to review and confirm.       Hypertension goal BP (blood pressure) < 140/90 2015     Priority: Medium       Surgical Hx:  has a past surgical history that includes  section (1975); TOTAL KNEE ARTHROPLASTY (Left); gastric bypass; Right Ankle ORIF; cataract iol, rt/lt; and tubal ligation.    Medications:   Current Outpatient Medications:      ASPIRIN PO, Take 81 mg by mouth daily, Disp: , Rfl:      Blood Glucose Monitoring Suppl (ACCU-CHEK COMPLETE) KIT, 1 each 2 times daily, Disp: 1 each, Rfl: 0     blood glucose test strip, Use to test blood sugar 2 times daily or as directed., Disp: 100 strip, Rfl: 5     Calcium Carb-Cholecalciferol 600-800 MG-UNIT TABS, Take 1 tablet by mouth daily, Disp: , Rfl:      clotrimazole (LOTRIMIN) 1 % cream, Apply topically 2 times daily, Disp: 40 g, Rfl: 2     cyanocobalamin (VITAMIN B12) 1000 MCG/ML injection, Inject 1 mL (1,000 mcg) into the muscle every 30 days, Disp: 1 mL, Rfl: 11     ferrous sulfate (IRON) 325 (65 FE) MG tablet, Take 325 mg by mouth daily (with breakfast), Disp: , Rfl:      glipiZIDE (GLUCOTROL) 5 MG tablet, Take 5 mg by mouth 2 times daily (before meals), Disp: , Rfl:      IBUPROFEN PO, Take 400 mg by mouth daily, Disp: , Rfl:      insulin glargine (LANTUS SOLOSTAR) 100 UNIT/ML pen, Inject 20 Units Subcutaneous, Disp: , Rfl:      insulin glargine (LANTUS SOLOSTAR) 100 UNIT/ML PEN, Inject 21 units nightly., Disp: 3 Month, Rfl: 1     insulin syringe-needle U-100 (BD INSULIN SYRINGE  "ULTRAFINE) 30G X 1/2\" 1 ML, Use one syringe monthly to inject B12., Disp: 12 each, Rfl: 1     levothyroxine (SYNTHROID, LEVOTHROID) 125 MCG tablet, Take 125 mcg by mouth daily, Disp: , Rfl:      liraglutide (VICTOZA PEN) 18 MG/3ML soln, Inject 1.2 mg Subcutaneous, Disp: , Rfl:      lisinopril (PRINIVIL,ZESTRIL) 40 MG tablet, Take 1 tablet (40 mg) by mouth daily, Disp: , Rfl:      metFORMIN (GLUCOPHAGE) 1000 MG tablet, Take 1,000 mg by mouth 2 times daily (with meals), Disp: , Rfl:      metFORMIN (GLUCOPHAGE) 500 MG tablet, TAKE 2 TABLETS (1,000 MG) BY MOUTH 2 TIMES DAILY (WITH MEALS), Disp: 180 tablet, Rfl: 0     ORDER FOR DME, Equipment being ordered: bilateral wrist splints, Disp: 2 each, Rfl: 0     pioglitazone (ACTOS) 15 MG tablet, Take 15 mg by mouth, Disp: , Rfl:      simvastatin (ZOCOR) 5 MG tablet, TAKE 2 TABLETS BY MOUTH AT BEDTIME, Disp: 90 tablet, Rfl: 0    Allergies:   Allergies   Allergen Reactions     Atenolol      PN: headache  PN: headache     Atorvastatin Other (See Comments)     Headaches     Exenatide      PN: LW Reaction: depressed  PN: LW Reaction: depressed     Hydrochlorothiazide W/Triamterene      PN: cramp  PN: cramp     Ranitidine      PN: burns stomach  PN: burns stomach       Social Hx:  .  reports that  has never smoked. she has never used smokeless tobacco. She reports that she drinks alcohol. She reports that she does not use drugs.    Family Hx: family history includes Diabetes in her paternal grandfather; Hypertension in her sister and son; Thyroid Disease in her maternal grandmother..    REVIEW OF SYSTEMS:  CONSTITUTIONAL:NEGATIVE for fever, chills, change in weight  INTEGUMENTARY/SKIN: NEGATIVE for worrisome rashes, moles or lesions  MUSCULOSKELETAL:See HPI above  NEURO: NEGATIVE for weakness, dizziness or paresthesias    This document serves as a record of the services and decisions personally performed and made by Ron Robert MD. It was created on his behalf by Danielle" "Tray, a trained medical scribe. The creation of this document is based the provider's statements to the medical scribe.    Farhat Feliz 10:45 AM 12/17/2018     PHYSICAL EXAM:  /84 (BP Location: Left arm, Patient Position: Left side, Cuff Size: Adult Regular)   Pulse 75   Ht 1.689 m (5' 6.5\")   Wt 91.6 kg (202 lb)   SpO2 96%   BMI 32.12 kg/m     GENERAL APPEARANCE: healthy, alert, no distress  SKIN: no suspicious lesions or rashes  NEURO: Normal strength and tone, mentation intact and speech normal  PSYCH:  mentation appears normal and affect normal, not anxious  RESPIRATORY: No increased work of breathing.  VASCULAR: Radial pulses 2+ and brisk capillary refill     MUSCULOSKELETAL:    RIGHT UPPER EXTREMITY:  Sensation intact to light touch in median, radial, ulnar and axillary nerve distributions  Palpable 2+ radial pulse, brisk capillary refill to all fingers, wwp  Intact epl fpl fdp edc wrist flexion/extension biceps triceps deltoid    RIGHT SHOULDER:  Shoulder Inspection: mild swelling, no ecchymosis. No erythema.  Tender: nontender to palpation   Range of Motion:   Active: forward flexion 110, external rotation 40   Strength: grossly intact but noted weakness on forward flexion, external rotation.    X-RAY INTERPRETATION: 3 views ( internal rotation, external rotation, Y) right shoulder obtained 11/14/2018 were reviewed personally in clinic today with the patient. On my review, there is a minimally displaced fracture of the proximal humerus neck, mild impaction and comminution. further callus formation seen as well as sclerosis. Mild acromio-clavicular degenerative changes. Grossly stable alignment to xrays 10/26/2018.      ASSESSMENT: Aav Goddard is a 68 year old female, right -hand dominant with a healing, right proximal humerus fracture.    PLAN:  * reviewed today's xrays with patient. Fracture alignment stable with further healing  * glad to hear overall improving. Noted stiffness, " weakness on exam.  * I think she'd benefit from Physical Therapy, but per patient, financially she can't do it. We have given her exercises to work on her own.  * cannot rule out rotator cuff injury given weakness. Discussed obtaining MRI, patient declines at this time.    * Immobilization: none. Work on range of motion.  * weightbearing: as tolerated, advised to return slowly.  * continue home exercise program, strengthening.  * Rest  * Activity modification - avoid activities that aggravate symptoms.  * Explained to patient that she is not cleared to drive until her next visit.   * NSAIDS - regular use for inflammation, with food, as long as no contra-indications. Please discuss with pcp if needed  * Ice twice daily to three times daily.  * Elevation of extremity to reduce swelling  * Tylenol as needed for pain  * return to clinic as needed      * global fracture care.    The information in this document, created by a scribe for me, accurately reflects the services I personally performed and the decisions made by me. I have reviewed and approved this document for accuracy.     Ron Robert M.D., M.S.  Dept. of Orthopaedic Surgery  [facility]

## 2018-12-17 NOTE — LETTER
12/17/2018         RE: Ava Goddard  [address]  [place] [address]        Dear Colleague,    Thank you for referring your patient, Ava Goddard, to the [facility] [place]. Please see a copy of my visit note below.    CHIEF COMPLAINT:   Chief Complaint   Patient presents with     Right Shoulder - RECHECK     RECHECK     right shoulder fracture      INJURY: proximal humerus fracture, right.  DATE of INJURY: 10/3/2018      HISTORY:  Ava Goddard is a 68 year old female, right -hand dominant, seen for fracture followup right proximal humerus fracture on 10/3/2018. She was walking to the bathroom, felt lightheaded and tripped over her dog, falling onto the right shoulder. Was seen at [facility] with xrays showing proximal humerus fracture. Was seen by us and will be treated nonsurgically. It has been 11 weeks since the initial injury. No pain today, rated a 0/10. Motion is improving. Has returned to all activities per comfort. She denies any problems or concerns. Again, has not done any formal physical therapy due to financial reasons. Has returned to work without problems.    Onset: following acute injury: fall  Symptoms have been improving since that time.  Aggravated by: with shoulder range of motion and palpation.  Relieved by: at rest, with ibuprofen briefly  Present symptoms: pain with any shoulder range of motion and palpation  Pain location: lateral shoulder, proximal arm  Pain severity: 0/10  Pain quality: sharp  Frequency of symptoms: frequently  Associated symptoms: none    Treatment up to this point: sling, aspirin, ibuprofen (declined pain medications at emergency room)  Prior history of related problems: no prior problems with this area in the past    Significant Orthopedic past medical history: none  Usual level of recreational activity: sedentary  Usual level of work activity: sedentary -     Other PMH:  has a past medical history of Diabetes  (H), Hypertension, and Thyroid disease.  Patient Active Problem List    Diagnosis Date Noted     Rosacea 2015     Priority: Medium     Thyroid nodule 2015     Priority: Medium     Type 2 diabetes mellitus with hyperglycemia (H) 2015     Priority: Medium     Hypothyroidism 2015     Priority: Medium     B12 deficiency 2015     Priority: Medium     S/P gastric bypass 2015     Priority: Medium     Iron deficiency anemia 2015     Priority: Medium     Osteoporosis 2015     Priority: Medium     Problem list name updated by automated process. Provider to review       Hyperlipidemia with target LDL less than 100 2015     Priority: Medium     Diagnosis updated by automated process. Provider to review and confirm.       Hypertension goal BP (blood pressure) < 140/90 2015     Priority: Medium       Surgical Hx:  has a past surgical history that includes  section (); TOTAL KNEE ARTHROPLASTY (Left); gastric bypass; Right Ankle ORIF; cataract iol, rt/lt; and tubal ligation.    Medications:   Current Outpatient Medications:      ASPIRIN PO, Take 81 mg by mouth daily, Disp: , Rfl:      Blood Glucose Monitoring Suppl (ACCU-CHEK COMPLETE) KIT, 1 each 2 times daily, Disp: 1 each, Rfl: 0     blood glucose test strip, Use to test blood sugar 2 times daily or as directed., Disp: 100 strip, Rfl: 5     Calcium Carb-Cholecalciferol 600-800 MG-UNIT TABS, Take 1 tablet by mouth daily, Disp: , Rfl:      clotrimazole (LOTRIMIN) 1 % cream, Apply topically 2 times daily, Disp: 40 g, Rfl: 2     cyanocobalamin (VITAMIN B12) 1000 MCG/ML injection, Inject 1 mL (1,000 mcg) into the muscle every 30 days, Disp: 1 mL, Rfl: 11     ferrous sulfate (IRON) 325 (65 FE) MG tablet, Take 325 mg by mouth daily (with breakfast), Disp: , Rfl:      glipiZIDE (GLUCOTROL) 5 MG tablet, Take 5 mg by mouth 2 times daily (before meals), Disp: , Rfl:      IBUPROFEN PO, Take 400 mg by mouth daily, Disp:  ", Rfl:      insulin glargine (LANTUS SOLOSTAR) 100 UNIT/ML pen, Inject 20 Units Subcutaneous, Disp: , Rfl:      insulin glargine (LANTUS SOLOSTAR) 100 UNIT/ML PEN, Inject 21 units nightly., Disp: 3 Month, Rfl: 1     insulin syringe-needle U-100 (BD INSULIN SYRINGE ULTRAFINE) 30G X 1/2\" 1 ML, Use one syringe monthly to inject B12., Disp: 12 each, Rfl: 1     levothyroxine (SYNTHROID, LEVOTHROID) 125 MCG tablet, Take 125 mcg by mouth daily, Disp: , Rfl:      liraglutide (VICTOZA PEN) 18 MG/3ML soln, Inject 1.2 mg Subcutaneous, Disp: , Rfl:      lisinopril (PRINIVIL,ZESTRIL) 40 MG tablet, Take 1 tablet (40 mg) by mouth daily, Disp: , Rfl:      metFORMIN (GLUCOPHAGE) 1000 MG tablet, Take 1,000 mg by mouth 2 times daily (with meals), Disp: , Rfl:      metFORMIN (GLUCOPHAGE) 500 MG tablet, TAKE 2 TABLETS (1,000 MG) BY MOUTH 2 TIMES DAILY (WITH MEALS), Disp: 180 tablet, Rfl: 0     ORDER FOR DME, Equipment being ordered: bilateral wrist splints, Disp: 2 each, Rfl: 0     pioglitazone (ACTOS) 15 MG tablet, Take 15 mg by mouth, Disp: , Rfl:      simvastatin (ZOCOR) 5 MG tablet, TAKE 2 TABLETS BY MOUTH AT BEDTIME, Disp: 90 tablet, Rfl: 0    Allergies:   Allergies   Allergen Reactions     Atenolol      PN: headache  PN: headache     Atorvastatin Other (See Comments)     Headaches     Exenatide      PN: LW Reaction: depressed  PN: LW Reaction: depressed     Hydrochlorothiazide W/Triamterene      PN: cramp  PN: cramp     Ranitidine      PN: burns stomach  PN: burns stomach       Social Hx:  .  reports that  has never smoked. she has never used smokeless tobacco. She reports that she drinks alcohol. She reports that she does not use drugs.    Family Hx: family history includes Diabetes in her paternal grandfather; Hypertension in her sister and son; Thyroid Disease in her maternal grandmother..    REVIEW OF SYSTEMS:  CONSTITUTIONAL:NEGATIVE for fever, chills, change in weight  INTEGUMENTARY/SKIN: NEGATIVE for worrisome rashes, " "moles or lesions  MUSCULOSKELETAL:See HPI above  NEURO: NEGATIVE for weakness, dizziness or paresthesias    This document serves as a record of the services and decisions personally performed and made by Ron Robert MD. It was created on his behalf by Danielle Feliz, a trained medical scribe. The creation of this document is based the provider's statements to the medical scribe.    Scribe Danielle Feliz 10:45 AM 12/17/2018     PHYSICAL EXAM:  /84 (BP Location: Left arm, Patient Position: Left side, Cuff Size: Adult Regular)   Pulse 75   Ht 1.689 m (5' 6.5\")   Wt 91.6 kg (202 lb)   SpO2 96%   BMI 32.12 kg/m      GENERAL APPEARANCE: healthy, alert, no distress  SKIN: no suspicious lesions or rashes  NEURO: Normal strength and tone, mentation intact and speech normal  PSYCH:  mentation appears normal and affect normal, not anxious  RESPIRATORY: No increased work of breathing.  VASCULAR: Radial pulses 2+ and brisk capillary refill     MUSCULOSKELETAL:    RIGHT UPPER EXTREMITY:  Sensation intact to light touch in median, radial, ulnar and axillary nerve distributions  Palpable 2+ radial pulse, brisk capillary refill to all fingers, wwp  Intact epl fpl fdp edc wrist flexion/extension biceps triceps deltoid    RIGHT SHOULDER:  Shoulder Inspection: mild swelling, no ecchymosis. No erythema.  Tender: nontender to palpation   Range of Motion:   Active: forward flexion 110, external rotation 40   Strength: grossly intact but noted weakness on forward flexion, external rotation.    X-RAY INTERPRETATION: 3 views ( internal rotation, external rotation, Y) right shoulder obtained 11/14/2018 were reviewed personally in clinic today with the patient. On my review, there is a minimally displaced fracture of the proximal humerus neck, mild impaction and comminution. further callus formation seen as well as sclerosis. Mild acromio-clavicular degenerative changes. Grossly stable alignment to xrays 10/26/2018.      ASSESSMENT: " Ava Goddard is a 68 year old female, right -hand dominant with a healing, right proximal humerus fracture.    PLAN:  * reviewed today's xrays with patient. Fracture alignment stable with further healing  * glad to hear overall improving. Noted stiffness, weakness on exam.  * I think she'd benefit from Physical Therapy, but per patient, financially she can't do it. We have given her exercises to work on her own.  * cannot rule out rotator cuff injury given weakness. Discussed obtaining MRI, patient declines at this time.    * Immobilization: none. Work on range of motion.  * weightbearing: as tolerated, advised to return slowly.  * continue home exercise program, strengthening.  * Rest  * Activity modification - avoid activities that aggravate symptoms.  * Explained to patient that she is not cleared to drive until her next visit.   * NSAIDS - regular use for inflammation, with food, as long as no contra-indications. Please discuss with pcp if needed  * Ice twice daily to three times daily.  * Elevation of extremity to reduce swelling  * Tylenol as needed for pain  * return to clinic as needed      * global fracture care.    The information in this document, created by a scribe for me, accurately reflects the services I personally performed and the decisions made by me. I have reviewed and approved this document for accuracy.     Ron Robert M.D., M.S.  Dept. of Orthopaedic Surgery  [facility]      Again, thank you for allowing me to participate in the care of your patient.        Sincerely,        Ron Robert MD